# Patient Record
Sex: FEMALE | Race: WHITE | Employment: OTHER | URBAN - METROPOLITAN AREA
[De-identification: names, ages, dates, MRNs, and addresses within clinical notes are randomized per-mention and may not be internally consistent; named-entity substitution may affect disease eponyms.]

---

## 2017-01-05 ENCOUNTER — OFFICE VISIT - RIVER FALLS (OUTPATIENT)
Dept: FAMILY MEDICINE | Facility: CLINIC | Age: 47
End: 2017-01-05
Payer: COMMERCIAL

## 2017-02-03 ENCOUNTER — OFFICE VISIT - RIVER FALLS (OUTPATIENT)
Dept: FAMILY MEDICINE | Facility: CLINIC | Age: 47
End: 2017-02-03
Payer: COMMERCIAL

## 2017-02-21 ENCOUNTER — OFFICE VISIT - RIVER FALLS (OUTPATIENT)
Dept: FAMILY MEDICINE | Facility: CLINIC | Age: 47
End: 2017-02-21
Payer: COMMERCIAL

## 2017-03-27 ENCOUNTER — OFFICE VISIT - RIVER FALLS (OUTPATIENT)
Dept: FAMILY MEDICINE | Facility: CLINIC | Age: 47
End: 2017-03-27
Payer: COMMERCIAL

## 2017-04-28 ENCOUNTER — OFFICE VISIT - RIVER FALLS (OUTPATIENT)
Dept: FAMILY MEDICINE | Facility: CLINIC | Age: 47
End: 2017-04-28
Payer: COMMERCIAL

## 2017-05-12 ENCOUNTER — COMMUNICATION - RIVER FALLS (OUTPATIENT)
Dept: FAMILY MEDICINE | Facility: CLINIC | Age: 47
End: 2017-05-12
Payer: COMMERCIAL

## 2017-05-12 ENCOUNTER — OFFICE VISIT - RIVER FALLS (OUTPATIENT)
Dept: FAMILY MEDICINE | Facility: CLINIC | Age: 47
End: 2017-05-12
Payer: COMMERCIAL

## 2017-06-06 ENCOUNTER — COMMUNICATION - RIVER FALLS (OUTPATIENT)
Dept: FAMILY MEDICINE | Facility: CLINIC | Age: 47
End: 2017-06-06
Payer: COMMERCIAL

## 2017-06-06 ENCOUNTER — AMBULATORY - RIVER FALLS (OUTPATIENT)
Dept: FAMILY MEDICINE | Facility: CLINIC | Age: 47
End: 2017-06-06
Payer: COMMERCIAL

## 2017-07-17 ENCOUNTER — AMBULATORY - RIVER FALLS (OUTPATIENT)
Dept: FAMILY MEDICINE | Facility: CLINIC | Age: 47
End: 2017-07-17
Payer: COMMERCIAL

## 2017-08-18 ENCOUNTER — AMBULATORY - RIVER FALLS (OUTPATIENT)
Dept: FAMILY MEDICINE | Facility: CLINIC | Age: 47
End: 2017-08-18
Payer: COMMERCIAL

## 2017-09-25 ENCOUNTER — AMBULATORY - RIVER FALLS (OUTPATIENT)
Dept: FAMILY MEDICINE | Facility: CLINIC | Age: 47
End: 2017-09-25
Payer: COMMERCIAL

## 2017-10-04 ENCOUNTER — TRANSFERRED RECORDS (OUTPATIENT)
Dept: HEALTH INFORMATION MANAGEMENT | Facility: CLINIC | Age: 47
End: 2017-10-04

## 2017-10-04 LAB — PAP SMEAR - HIM PATIENT REPORTED: NEGATIVE

## 2017-11-01 ENCOUNTER — AMBULATORY - RIVER FALLS (OUTPATIENT)
Dept: FAMILY MEDICINE | Facility: CLINIC | Age: 47
End: 2017-11-01
Payer: COMMERCIAL

## 2017-12-14 ENCOUNTER — AMBULATORY - RIVER FALLS (OUTPATIENT)
Dept: FAMILY MEDICINE | Facility: CLINIC | Age: 47
End: 2017-12-14
Payer: COMMERCIAL

## 2017-12-14 ENCOUNTER — OFFICE VISIT - RIVER FALLS (OUTPATIENT)
Dept: FAMILY MEDICINE | Facility: CLINIC | Age: 47
End: 2017-12-14
Payer: COMMERCIAL

## 2017-12-14 ENCOUNTER — TRANSFERRED RECORDS (OUTPATIENT)
Dept: HEALTH INFORMATION MANAGEMENT | Facility: CLINIC | Age: 47
End: 2017-12-14

## 2017-12-14 ASSESSMENT — MIFFLIN-ST. JEOR: SCORE: 1276.1

## 2018-01-25 ENCOUNTER — AMBULATORY - RIVER FALLS (OUTPATIENT)
Dept: FAMILY MEDICINE | Facility: CLINIC | Age: 48
End: 2018-01-25
Payer: COMMERCIAL

## 2018-01-26 ENCOUNTER — AMBULATORY - RIVER FALLS (OUTPATIENT)
Dept: FAMILY MEDICINE | Facility: CLINIC | Age: 48
End: 2018-01-26
Payer: COMMERCIAL

## 2018-02-09 ENCOUNTER — AMBULATORY - RIVER FALLS (OUTPATIENT)
Dept: FAMILY MEDICINE | Facility: CLINIC | Age: 48
End: 2018-02-09
Payer: COMMERCIAL

## 2018-03-12 ENCOUNTER — AMBULATORY - RIVER FALLS (OUTPATIENT)
Dept: FAMILY MEDICINE | Facility: CLINIC | Age: 48
End: 2018-03-12
Payer: COMMERCIAL

## 2018-04-12 ENCOUNTER — AMBULATORY - RIVER FALLS (OUTPATIENT)
Dept: FAMILY MEDICINE | Facility: CLINIC | Age: 48
End: 2018-04-12
Payer: COMMERCIAL

## 2018-05-14 ENCOUNTER — AMBULATORY - RIVER FALLS (OUTPATIENT)
Dept: FAMILY MEDICINE | Facility: CLINIC | Age: 48
End: 2018-05-14
Payer: COMMERCIAL

## 2018-06-08 ENCOUNTER — AMBULATORY - RIVER FALLS (OUTPATIENT)
Dept: FAMILY MEDICINE | Facility: CLINIC | Age: 48
End: 2018-06-08
Payer: COMMERCIAL

## 2018-07-26 ENCOUNTER — OFFICE VISIT - RIVER FALLS (OUTPATIENT)
Dept: FAMILY MEDICINE | Facility: CLINIC | Age: 48
End: 2018-07-26
Payer: COMMERCIAL

## 2018-07-26 ENCOUNTER — AMBULATORY - RIVER FALLS (OUTPATIENT)
Dept: FAMILY MEDICINE | Facility: CLINIC | Age: 48
End: 2018-07-26
Payer: COMMERCIAL

## 2018-08-28 ENCOUNTER — AMBULATORY - RIVER FALLS (OUTPATIENT)
Dept: FAMILY MEDICINE | Facility: CLINIC | Age: 48
End: 2018-08-28
Payer: COMMERCIAL

## 2018-09-13 ENCOUNTER — AMBULATORY - RIVER FALLS (OUTPATIENT)
Dept: FAMILY MEDICINE | Facility: CLINIC | Age: 48
End: 2018-09-13
Payer: COMMERCIAL

## 2018-10-11 ENCOUNTER — AMBULATORY - RIVER FALLS (OUTPATIENT)
Dept: FAMILY MEDICINE | Facility: CLINIC | Age: 48
End: 2018-10-11
Payer: COMMERCIAL

## 2018-10-11 ENCOUNTER — TRANSFERRED RECORDS (OUTPATIENT)
Dept: HEALTH INFORMATION MANAGEMENT | Facility: CLINIC | Age: 48
End: 2018-10-11

## 2018-11-01 ENCOUNTER — TELEPHONE (OUTPATIENT)
Dept: OTHER | Facility: CLINIC | Age: 48
End: 2018-11-01

## 2018-11-01 ENCOUNTER — OFFICE VISIT (OUTPATIENT)
Dept: FAMILY MEDICINE | Facility: CLINIC | Age: 48
End: 2018-11-01
Payer: COMMERCIAL

## 2018-11-01 VITALS
RESPIRATION RATE: 18 BRPM | BODY MASS INDEX: 23.22 KG/M2 | DIASTOLIC BLOOD PRESSURE: 62 MMHG | WEIGHT: 136 LBS | HEART RATE: 66 BPM | TEMPERATURE: 98 F | SYSTOLIC BLOOD PRESSURE: 100 MMHG | OXYGEN SATURATION: 98 % | HEIGHT: 64 IN

## 2018-11-01 DIAGNOSIS — D68.62 LUPUS ANTICOAGULANT DISORDER (H): ICD-10-CM

## 2018-11-01 DIAGNOSIS — J45.30 MILD PERSISTENT ASTHMA, UNSPECIFIED WHETHER COMPLICATED: ICD-10-CM

## 2018-11-01 DIAGNOSIS — I83.812 VARICOSE VEINS OF LEFT LOWER EXTREMITY WITH PAIN: ICD-10-CM

## 2018-11-01 DIAGNOSIS — D68.51 FACTOR V LEIDEN (H): ICD-10-CM

## 2018-11-01 DIAGNOSIS — Z79.01 LONG TERM CURRENT USE OF ANTICOAGULANT THERAPY: ICD-10-CM

## 2018-11-01 DIAGNOSIS — I82.409 RECURRENT DEEP VEIN THROMBOSIS (DVT) (H): Primary | ICD-10-CM

## 2018-11-01 LAB — INR PPP: 3.08 (ref 0.86–1.14)

## 2018-11-01 PROCEDURE — 85610 PROTHROMBIN TIME: CPT | Performed by: FAMILY MEDICINE

## 2018-11-01 PROCEDURE — 36415 COLL VENOUS BLD VENIPUNCTURE: CPT | Performed by: FAMILY MEDICINE

## 2018-11-01 PROCEDURE — 99203 OFFICE O/P NEW LOW 30 MIN: CPT | Performed by: FAMILY MEDICINE

## 2018-11-01 RX ORDER — WARFARIN SODIUM 5 MG/1
TABLET ORAL
Qty: 1 TABLET | Refills: 0 | COMMUNITY
Start: 2018-11-01 | End: 2019-01-14

## 2018-11-01 ASSESSMENT — PAIN SCALES - GENERAL: PAINLEVEL: NO PAIN (0)

## 2018-11-01 NOTE — PATIENT INSTRUCTIONS
Thank you for choosing [facility].  You may be receiving a survey in the mail from [facility] regarding your visit today.  Please take a few minutes to complete and return the survey to let us know how we are doing.      Our Clinic hours are:  Mondays    7:20 am - 7 pm  Tues - Fri  7:20 am - 5 pm    Clinic Phone: [phone]    The clinic lab opens at 7:30 am Mon - Fri and appointments are required.    [facility]. [phone]  Monday  8 am - 7pm  Tues - Fri 8 am - 5:30 pm

## 2018-11-01 NOTE — MR AVS SNAPSHOT
After Visit Summary   11/1/2018    Ignacio Cooper    MRN: [id number]           Patient Information     Date Of Birth          1970        Visit Information        Provider Department      11/1/2018 9:40 AM Fidelia Forbes MD [facility]        Today's Diagnoses     Recurrent deep vein thrombosis (DVT) (H)    -  1    Lupus anticoagulant disorder (H)        Factor V Leiden (H)        Long term current use of anticoagulant therapy        Mild persistent asthma, unspecified whether complicated        Varicose veins of left lower extremity with pain          Care Instructions          Thank you for choosing [facility].  You may be receiving a survey in the mail from [employer] regarding your visit today.  Please take a few minutes to complete and return the survey to let us know how we are doing.      Our Clinic hours are:  Mondays    7:20 am - 7 pm  Tues -  Fri  7:20 am - 5 pm    Clinic Phone: [phone]    The clinic lab opens at 7:30 am Mon - Fri and appointments are required.    [facility] [place]  Ph. [phone]  Monday  8 am - 7pm  Tues - Fri 8 am - 5:30 pm                 Follow-ups after your visit        Additional Services     INR CLINIC REFERRAL       Your provider has referred you to INR Services.    Please be aware that coverage of these services is subject to the terms and limitations of your health insurance plan.  Call member services at your health plan with any benefit or coverage questions.    Indication for Anticoagulation: DVT (recurrent)  Other: lupus anticoagulant and factor V leiden positive  If nonstandard INR is desired, indicate goal range and explanation:  n/a  Expected Duration of Therapy: Lifetime            VASCULAR SURGERY REFERRAL       Your provider has referred you to: **Vascular  Services [phone] - Varicose Veins with history of multiple DVTs and venous surgeries (stripping, ablation, and has some vascular  "stenting in the iliac vein & None - Please Order Appropriate Testing   [email or web address]    Please be aware that coverage of these services is subject to the terms and limitations of your health insurance plan.  Call member services at your health plan with any benefit or coverage questions.      Please bring the following with you to your appointment:    (1) Any X-Rays, CTs or MRIs which have been performed.  Contact the facility where they were done to arrange for  prior to your scheduled appointment.    (2) List of current medications   (3) This referral request   (4) Any documents/labs given to you for this referral                  Who to contact     If you have questions or need follow up information about today's clinic visit or your schedule please contact [facility] directly at [phone].  Normal or non-critical lab and imaging results will be communicated to you by MyChart, letter or phone within 4 business days after the clinic has received the results. If you do not hear from us within 7 days, please contact the clinic through MyChart or phone. If you have a critical or abnormal lab result, we will notify you by phone as soon as possible.  Submit refill requests through [employer] or call your pharmacy and they will forward the refill request to us. Please allow 3 business days for your refill to be completed.          Additional Information About Your Visit        Care EveryWhere ID     This is your Care EveryWhere ID. This could be used by other organizations to access your [place] medical records  [id number]        Your Vitals Were     Pulse Temperature Respirations Height Last Period Pulse Oximetry    66 98  F (36.7  C) (Tympanic) 18 5' 4\" (1.626 m) 10/20/2018 98%    Breastfeeding? BMI (Body Mass Index)                No 23.34 kg/m2           Blood Pressure from Last 3 Encounters:   11/01/18 100/62    Weight from Last 3 Encounters: "   11/01/18 136 lb (61.7 kg)              We Performed the Following     INR CLINIC REFERRAL     VASCULAR SURGERY REFERRAL          Where to get your medicines      [place] [facility] [place] [address] [facility]  [address] [facility] [address]     Phone:  [phone]     fluticasone-vilanterol 100-25 MCG/INH inhaler          Primary Care Provider Office Phone # Fax #    [facility] [phone] [address] [facility] [address]        Equal Access to Services     [facility] : Hadii aad ku hadasho Soomaali, waaxda luqadaha, qaybta kaalmada adeegyada, waxay idiin hayaan adesandhya brown . So [facility] [phone].    ATENCIÓN: Si habla español, tiene a rubi disposición servicios gratuitos de asistencia lingüística. [facility] [phone].    We comply with applicable federal civil rights laws and [place] laws. We do not discriminate on the basis of race, color, national origin, age, disability, sex, sexual orientation, or gender identity.            Thank you!     Thank you for choosing [facility]  for your care. Our goal is always to provide you with excellent care. Hearing back from our patients is one way we can continue to improve our services. Please take a few minutes to complete the written survey that you may receive in the mail after your visit with us. Thank you!             Your Updated Medication List - Protect others around you: Learn how to safely use, store and throw away your medicines at www.disposemymeds.org.          This list is accurate as of 11/1/18 10:19 AM.  Always use your most recent med list.                   Brand Name Dispense Instructions for use Diagnosis    fluticasone-vilanterol 100-25 MCG/INH inhaler    BREO ELLIPTA    60 Inhaler    Inhale 1 puff into the lungs daily    Mild persistent asthma, unspecified whether complicated       warfarin 5  MG tablet    COUMADIN    1 tablet    Take 1 tablet (5 mg) by mouth daily 5 mg for two days, then 7.5 mg x 1 day, repeat

## 2018-11-01 NOTE — PROGRESS NOTES
"  SUBJECTIVE:   Ignacio Cooper is a 48 year old female who presents to clinic today for the following health issues:      Chief Complaint   Patient presents with     INR Followup     Needs someone to follow care     SUBJECTIVE:  Ignacio Cooper, a 48 year old female scheduled an appointment to discuss the following issues:     Recurrent deep vein thrombosis (DVT) (H)  Lupus anticoagulant disorder (H)  Factor V Leiden (H)  Long term current use of anticoagulant therapy  Mild persistent asthma, unspecified whether complicated  Varicose veins of left lower extremity with pain     Transfer of care from [facility].  Her  has worked at [employer] and was commuting over an hour a day, they finally decided to move.  Has five kids ages 12-23.      Patient has a history of multiple recurrent DVTs, +Factor V, +lupus anticoagulant.  She is on lifelong anticoagulation.  She would also like to see vascular to discuss left lower extremity varicose veins.      She has mild persistent asthma, was recently seen at [facility] and started on Breo Ellipta. Seems to be helping her symptoms.    Social History   Substance Use Topics     Smoking status: Never Smoker     Smokeless tobacco: Never Used     Alcohol use No         Medical, social, surgical, and family histories reviewed.    ROS:  5 point ROS negative except as noted above in HPI, including Gen., Resp., CV, GI &  system review.    Family History   Problem Relation Age of Onset     Lupus Mother      Breast Cancer Mother      Blood Disease Mother      Factor 5     HEART DISEASE Mother      Diabetes Father      Asthma Father      Past Surgical History:   Procedure Laterality Date     C SC LIGATION, DIVISION AND STRIPPING OF VEIN, LOWER EXN       iliac vein stent       OPEN REDUCTION INTERNAL FIXATION WRIST           OBJECTIVE:  /62  Pulse 66  Temp 98  F (36.7  C) (Tympanic)  Resp 18  Ht 5' 4\" (1.626 m)  Wt 136 lb (61.7 kg)  LMP 10/20/2018  SpO2 98%  " "Breastfeeding? No  BMI 23.34 kg/m2  EXAM:  GENERAL APPEARANCE: Alert, no acute distress  EYES: PERRL, EOM normal, conjunctiva and lids normal  RESP: lungs clear to auscultation   CV: normal rate, regular rhythm, no murmur or gallop  ABDOMEN: soft, no organomegaly, masses or tenderness  MS: extremities normal, no peripheral edema  SKIN: no suspicious lesions or rashes  PSYCH: mentation appears normal., affect and mood normal    ASSESSMENT/PLAN:    (I82.409) Recurrent deep vein thrombosis (DVT) (H)  (primary encounter diagnosis)  Comment:    Plan: INR CLINIC REFERRAL, VASCULAR SURGERY REFERRAL,        INR             (D68.62) Lupus anticoagulant disorder (H)  Comment:    Plan: INR CLINIC REFERRAL, VASCULAR SURGERY REFERRAL,        INR             (D68.51) Factor V Leiden (H)  Comment:    Plan: INR CLINIC REFERRAL, VASCULAR SURGERY REFERRAL,        INR             (Z79.01) Long term current use of anticoagulant therapy  Comment:    Plan: INR CLINIC REFERRAL         Check INR today, needs lifetime anticoagulation    (J45.30) Mild persistent asthma, unspecified whether complicated  Comment:    Plan: fluticasone-vilanterol (BREO ELLIPTA) 100-25         MCG/INH inhaler         Patient declined albuterol inhaler    (I83.812) Varicose veins of left lower extremity with pain  Comment: interested in seeing vascular to discuss treatment options for left leg varicose veins that cause pain.  She was seen at [facility] and \"they treated me like I was only worried about the cosmetics and didn't seem to understand my condition\"  Plan: VASCULAR SURGERY REFERRAL                  Patient Instructions         Thank you for choosing [facility]  You may be receiving a survey in the mail from [facility] regarding your visit today.  Please take a few minutes to complete and return the survey to let us know how we are doing.      Our Clinic hours are:  Mondays    7:20 am - 7 pm  Tues -  Fri  7:20 am - 5 pm    Clinic Phone: " [phone]    The clinic lab opens at 7:30 am Mon - Fri and appointments are required.    [facility]. [phone]  Monday  8 am - 7pm  Tues - Fri 8 am - 5:30 pm

## 2018-11-01 NOTE — TELEPHONE ENCOUNTER
Pt referred to [facility] by Fidelia Forbes MD  for DVT, Lupus, Factor V Leiden, varicose veins.    I called and spoke with patient in regards to new referral- she reports that she was receiving care in [place] thru [facility]. Note made in appointment for [place] clinic to obtain care everywhere consent and pull records from [facility] on patients appointment day. Patient denies recent imaging.     Pt scheduled for consult with Dr. Mercedes in [place] on 11/8/18.      Sena Au, FREYAN, RN

## 2018-11-02 DIAGNOSIS — D68.62 LUPUS ANTICOAGULANT DISORDER (H): ICD-10-CM

## 2018-11-02 DIAGNOSIS — D68.51 FACTOR V LEIDEN (H): ICD-10-CM

## 2018-11-02 DIAGNOSIS — I82.409 RECURRENT DEEP VEIN THROMBOSIS (DVT) (H): Primary | ICD-10-CM

## 2018-11-02 NOTE — PROGRESS NOTES
Notify patient - INR is 3.08, may want to skip one of the 7.5 mg pills and do 5 mg daily for that day.  Follow up in a few weeks as planned with INR clinic.    Fidelia Forbes M.D.

## 2018-11-05 ENCOUNTER — ANTICOAGULATION THERAPY VISIT (OUTPATIENT)
Dept: ANTICOAGULATION | Facility: CLINIC | Age: 48
End: 2018-11-05
Payer: COMMERCIAL

## 2018-11-05 DIAGNOSIS — D68.62 LUPUS ANTICOAGULANT DISORDER (H): ICD-10-CM

## 2018-11-05 DIAGNOSIS — D68.51 FACTOR V LEIDEN (H): ICD-10-CM

## 2018-11-05 DIAGNOSIS — I82.409 RECURRENT DEEP VEIN THROMBOSIS (DVT) (H): ICD-10-CM

## 2018-11-05 DIAGNOSIS — Z79.01 LONG TERM CURRENT USE OF ANTICOAGULANT THERAPY: Primary | ICD-10-CM

## 2018-11-05 PROBLEM — J45.30 MILD PERSISTENT ASTHMA, UNSPECIFIED WHETHER COMPLICATED: Status: ACTIVE | Noted: 2018-11-05

## 2018-11-05 PROCEDURE — 99207 ZZC NO CHARGE NURSE ONLY: CPT

## 2018-11-05 NOTE — PROGRESS NOTES
ANTICOAGULATION FOLLOW-UP CLINIC VISIT    Patient Name:  Ignacio Cooper  Date:  11/5/2018  Contact Type:  Telephone (dosing instructions came from Dr. Fidelia Forbes)    SUBJECTIVE:     Patient Findings     Positives No Problem Findings    Comments Patient has been on warfarin for 19 years, she does not need a new consult appointment with [facility]. Writer confirmed what days patient was taking which warfarin dose. She takes 5mg, 5mg, 7.5mg and then repeats.     She states she has been stable on this warfarin dose. In the past she had her family practice provider managing her warfarin. She also would prefer to go to the lab (does not prefer to have the POCT). She is aware [facility] will call either Tuesday or Wednesday with the result and further warfarin dosing instructions.    Writer explained our clinic typically does not dose warfarin with the rotating schedule because some weeks she will have more warfarin than others due to the odd number of days in the week. She is open to switching her dosing to a set weekly dose if needed.            OBJECTIVE    INR   Date Value Ref Range Status   11/01/2018 3.08 (H) 0.86 - 1.14 Final       ASSESSMENT / PLAN  INR assessment THER +/- 0.1 of goal INR range   Recheck INR In: 2 WEEKS    INR Location Outside lab      Anticoagulation Summary as of 11/5/2018     INR goal 2.0-3.0   Today's INR 3.08! (11/1/2018)   Warfarin maintenance plan 5 mg (5 mg x 1), then 5 mg (5 mg x 1), then 7.5 mg (5 mg x 1.5) repeating every 3 days   Full warfarin instructions 5 mg, then 5 mg, then 7.5 mg repeating every 3 days   Average weekly warfarin total 40.84615378205386406 mg   Plan last modified Koki Ascencio RN (11/5/2018)   Next INR check 11/13/2018   Priority INR   Target end date Indefinite    Indications   Lupus anticoagulant disorder (H) [D68.62]  Factor V Leiden (H) [D68.51]  Recurrent deep vein thrombosis (DVT) (H) [I82.409]         Anticoagulation Episode Summary     INR check location      Preferred lab     Send INR reminders to CL ANTICOAG POOL    Comments       Anticoagulation Care Providers     Provider Role Specialty Phone number    Fidelia Forbes MD Referring [facility] [phone]            See the Encounter Report to view Anticoagulation Flowsheet and Dosing Calendar (Go to Encounters tab in chart review, and find the Anticoagulation Therapy Visit)        Koki Ascencio RN [facility]

## 2018-11-05 NOTE — MR AVS SNAPSHOT
Ignacio Malhotrajair   11/5/2018   Anticoagulation Therapy Visit    Description:  48 year old female   Provider:  Koki Ascencio, RN   Department:  [facility]           INR as of 11/5/2018     Today's INR 3.08! (11/1/2018)      Anticoagulation Summary as of 11/5/2018     INR goal 2.0-3.0   Today's INR 3.08! (11/1/2018)   Full warfarin instructions 5 mg, then 5 mg, then 7.5 mg repeating every 3 days   Next INR check 11/13/2018    Indications   Lupus anticoagulant disorder (H) [D68.62]  Factor V Leiden (H) [D68.51]  Recurrent deep vein thrombosis (DVT) (H) [I82.409]         November 2018 Details    Sun Mon Tue Wed Thu Fri Sat         1               2               3                 4               5      7.5 mg   See details      6      5 mg         7      5 mg         8      7.5 mg         9      5 mg         10      5 mg           11      7.5 mg         12      5 mg         13            14               15               16               17                 18               19               20               21               22               23               24                 25               26               27               28               29               30                 Date Details   11/05 This INR check       Date of next INR:  11/13/2018         How to take your warfarin dose     To take:  5 mg Take 1 of the 5 mg tablets.    To take:  7.5 mg Take 1.5 of the 5 mg tablets.

## 2018-11-08 ENCOUNTER — OFFICE VISIT (OUTPATIENT)
Dept: VASCULAR SURGERY | Facility: CLINIC | Age: 48
End: 2018-11-08
Payer: COMMERCIAL

## 2018-11-08 VITALS
DIASTOLIC BLOOD PRESSURE: 65 MMHG | SYSTOLIC BLOOD PRESSURE: 107 MMHG | TEMPERATURE: 99.1 F | WEIGHT: 136 LBS | BODY MASS INDEX: 23.22 KG/M2 | HEART RATE: 70 BPM | HEIGHT: 64 IN

## 2018-11-08 DIAGNOSIS — I83.12 VARICOSE VEINS OF LEFT LOWER EXTREMITY WITH INFLAMMATION: ICD-10-CM

## 2018-11-08 DIAGNOSIS — I82.409 RECURRENT DEEP VEIN THROMBOSIS (DVT) (H): ICD-10-CM

## 2018-11-08 DIAGNOSIS — D68.62 LUPUS ANTICOAGULANT DISORDER (H): ICD-10-CM

## 2018-11-08 DIAGNOSIS — D68.51 FACTOR V LEIDEN (H): Primary | ICD-10-CM

## 2018-11-08 PROCEDURE — 85730 THROMBOPLASTIN TIME PARTIAL: CPT | Performed by: INTERNAL MEDICINE

## 2018-11-08 PROCEDURE — 99204 OFFICE O/P NEW MOD 45 MIN: CPT | Performed by: INTERNAL MEDICINE

## 2018-11-08 PROCEDURE — 00000167 ZZHCL STATISTIC INR NC: Performed by: INTERNAL MEDICINE

## 2018-11-08 PROCEDURE — 85732 THROMBOPLASTIN TIME PARTIAL: CPT | Performed by: INTERNAL MEDICINE

## 2018-11-08 PROCEDURE — 36415 COLL VENOUS BLD VENIPUNCTURE: CPT | Performed by: INTERNAL MEDICINE

## 2018-11-08 PROCEDURE — 00000401 ZZHCL STATISTIC THROMBIN TIME NC: Performed by: INTERNAL MEDICINE

## 2018-11-08 PROCEDURE — 85613 RUSSELL VIPER VENOM DILUTED: CPT | Performed by: INTERNAL MEDICINE

## 2018-11-08 PROCEDURE — 85597 PHOSPHOLIPID PLTLT NEUTRALIZ: CPT | Performed by: INTERNAL MEDICINE

## 2018-11-08 NOTE — PROGRESS NOTES
Vascular Medicine Consultation     Chief Complaint   Symptomatic varicose vein left lower extremity    Date of Admission:  (Not on file)    Ignacio Cooper is a 48 year old female who symptomatic varicose veins left lower extremity.    Code Status    Full code    Reason for Consult   Reason for consult: I was asked by PCP to evaluate this patient for symptomatic varicose veins of the left lower extremity.    Primary Care Physician   [facility]      History is obtained from the patient    History of Present Illness   Ignacio Cooper is a 48 year old female who presents with symptomatic varicose veins left lower extremity, patient is having pain in a clump of varicose veins of the left lower extremity just above the ankle, patient denies any history of bleeding or spontaneous ulceration, patient has a long-standing history of recurrent DVT in the left leg x4 first episode happened when she was at the age of 24 and she was on oral contraceptive pills then she was given the diagnosis of factor V Leiden mutation homozygous type (based on the patient's own words when asked about her throat versus homozygous she said that she has the bad type), patient was also diagnosed with lupus anticoagulant based on single lab test and on obtaining her history she mentioned that she had a stent placed in the iliac vein left side with remarkable improvement of her symptoms after the stent was placed  Patient has been on oral anticoagulation, Coumadin for a number of years now, she is stable, her INR is within the target 2-3, with no complications  Patient is here today for prominent, painful, tender to touch with no evidence of thrombosis of a lump and cluster of veins on the left leg above the ankle  No signs of venous stasis, lipodermatosclerosis, when asked about itching and restless leg symptoms she denied  Patient is a runner she is fit in a very good shape and the symptoms does not increase with  exercise  Patient does not wear compression stockings  From obtaining the patient's history 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   History reviewed. No pertinent past medical history.    Past Surgical History   I have reviewed this patient's surgical history and updated it with pertinent information if needed.  Past Surgical History:   Procedure Laterality Date     C SC LIGATION, DIVISION AND STRIPPING OF VEIN, LOWER EXN       iliac vein stent       OPEN REDUCTION INTERNAL FIXATION WRIST         Prior to Admission Medications   Cannot display prior to admission medications because the patient has not been admitted in this contact.     Allergies   Allergies   Allergen Reactions     Codeine        Social History   I have reviewed this patient's social history and updated it with pertinent information if needed. Ignacio Cooper  reports that she has never smoked. She has never used smokeless tobacco. She reports that she does not drink alcohol or use illicit drugs.    Family History   I have reviewed this patient's family history and updated it with pertinent information if needed.   Family History   Problem Relation Age of Onset     Lupus Mother      Breast Cancer Mother      Blood Disease Mother      Factor 5     HEART DISEASE Mother      Diabetes Father      Asthma Father        Review of Systems   The 10 point Review of Systems is negative other than noted in the HPI or here.     Physical Exam   Temp: 99.1  F (37.3  C) Temp src: Tympanic BP: 107/65 Pulse: 70            Vital Signs with Ranges  Temp:  [99.1  F (37.3  C)] 99.1  F (37.3  C)  Pulse:  [70-73] 70  BP: (106-107)/(60-65) 107/65  136 lbs 0 oz    Constitutional: awake, alert,  cooperative, no apparent distress, and appears stated age  Eyes: Lids and lashes normal, pupils equal, round and reactive to light, extra ocular muscles intact, sclera clear, conjunctiva normal  ENT: normocepalic, without obvious abnormality, oropharynx pink and moist  Hematologic / Lymphatic: no lymphadenopathy  Respiratory: No increased work of breathing, good air exchange, clear to auscultation bilaterally, no crackles or wheezing  Cardiovascular: regular rate and rhythm, normal S1 and S2 and no murmur noted  GI: Normal bowel sounds, soft, non-distended, non-tender  Skin: no redness, warmth, or swelling, no rashes and no lesions  Musculoskeletal: There is no redness, warmth, or swelling of the joints.  Full range of motion noted.  Motor strength is 5 out of 5 all extremities bilaterally.  Tone is normal.  Neurologic: Awake, alert, oriented to name, place and time.  Cranial nerves II-XII are grossly intact.  Motor is 5 out of 5 bilaterally.    Neuropsychiatric:  Normal affect, memory, insight.  Pulses: Palpable pulses intact  . No carotid bruits appreciated.     Data   Most Recent 3 CBC's:No lab results found.  Most Recent 3 BMP's:No lab results found.  Most Recent 2 LFT's:No lab results found.  Most Recent TSH and T4:No lab results found.  Most Recent Hemoglobin A1c:No lab results found.  Most Recent 6 glucoses:No lab results found.    Invalid input(s): BPM  Most Recent Urinalysis:No lab results found.  Most Recent ESR & CRP:No lab results found.      Assessment & Plan   (D68.51) Factor V Leiden (H)  (primary encounter diagnosis)  Comment: Stable patient is on anticoagulation  Plan: Continue with anticoagulation, patient will need to test her kids for factor V mutation    (D68.62) Lupus anticoagulant disorder (H)  Comment: Positive only once  Plan: Lupus Anticoagulant Panel        Today and in 3 months from now    (I82.409) Recurrent deep vein thrombosis (DVT) (H)  Comment: Most probably secondary to May Thurner  anatomy/syndrome, after 5 mutation homozygous type and the fact that she was on oral contraceptive  Plan: US Venous Competency Left        To assess the severity of reflux    (I83.12) Varicose veins of left lower extremity with inflammation  Comment: Patient will probably benefit from sclerotherapy localized to the cluster of veins/varicose veins  Plan: US Venous Competency Left              Summary: We will see the patient once test results are available and will arrange for sclerotherapy    Jose F Mercedes MD

## 2018-11-08 NOTE — NURSING NOTE
"Initial /65 (BP Location: Left arm, Patient Position: Sitting, Cuff Size: Adult Regular)  Pulse 70  Temp 99.1  F (37.3  C) (Tympanic)  Ht 1.626 m (5' 4\")  Wt 61.7 kg (136 lb)  LMP 10/20/2018  BMI 23.34 kg/m2 Estimated body mass index is 23.34 kg/(m^2) as calculated from the following:    Height as of this encounter: 1.626 m (5' 4\").    Weight as of this encounter: 61.7 kg (136 lb). .    Fidelia Alves MA    "

## 2018-11-08 NOTE — MR AVS SNAPSHOT
After Visit Summary   11/8/2018    Ignacio Cooper    MRN: [id number]           Patient Information     Date Of Birth          1970        Visit Information        Provider Department      11/8/2018 1:00 PM Jose F Mercedes MD [facility]        Today's Diagnoses     Factor V Leiden (H)    -  1    Lupus anticoagulant disorder (H)        Recurrent deep vein thrombosis (DVT) (H)        Varicose veins of left lower extremity with inflammation           Follow-ups after your visit        Follow-up notes from your care team     Return in about 4 weeks (around 12/6/2018).      Your next 10 appointments already scheduled     Nov 13, 2018 10:00 AM CST   LAB with CL LAB   [facility] ([facility])    [address] [facility] [id number] [phone]           Please do not eat 10-12 hours before your appointment if you are coming in fasting for labs on lipids, cholesterol, or glucose (sugar). This does not apply to pregnant women. Water, hot tea and black coffee (with nothing added) are okay. Do not drink other fluids, diet soda or chew gum.              Future tests that were ordered for you today     Open Future Orders        Priority Expected Expires Ordered    US Venous Competency Left Routine 11/8/2018 11/8/2019 11/8/2018            Who to contact     If you have questions or need follow up information about today's clinic visit or your schedule please contact [facility] directly at [phone].  Normal or non-critical lab and imaging results will be communicated to you by MyChart, letter or phone within 4 business days after the clinic has received the results. If you do not hear from us within 7 days, please contact the clinic through MyChart or phone. If you have a critical or abnormal lab result, we will notify you by phone as soon as possible.  Submit refill requests through [employer] or call your pharmacy and they  "will forward the refill request to us. Please allow 3 business days for your refill to be completed.          Additional Information About Your Visit        Care EveryWhere ID     This is your Care EveryWhere ID. This could be used by other organizations to access your [place] medical records  [id number]        Your Vitals Were     Pulse Temperature Height Last Period BMI (Body Mass Index)       70 99.1  F (37.3  C) (Tympanic) 1.626 m (5' 4\") 10/20/2018 23.34 kg/m2        Blood Pressure from Last 3 Encounters:   11/08/18 107/65   11/01/18 100/62    Weight from Last 3 Encounters:   11/08/18 61.7 kg (136 lb)   11/01/18 61.7 kg (136 lb)              We Performed the Following     Lupus Anticoagulant Panel        Primary Care Provider Office Phone # Fax #    [facility] [phone] [address] [facility] [address]        Equal Access to Services     LU CISSE : Hadii aad ku hadasho Soomaali, waaxda luqadaha, qaybta kaalmada adeegyada, waxay alidain haysheelan lionel brown . So [facility] [phone].    ATENCIÓN: Si harikala español, tiene a rubi disposición servicios gratuitos de asistencia lingüística. Llame al [phone].    We comply with applicable federal civil rights laws and [place] laws. We do not discriminate on the basis of race, color, national origin, age, disability, sex, sexual orientation, or gender identity.            Thank you!     Thank you for choosing [facility]  for your care. Our goal is always to provide you with excellent care. Hearing back from our patients is one way we can continue to improve our services. Please take a few minutes to complete the written survey that you may receive in the mail after your visit with us. Thank you!             Your Updated Medication List - Protect others around you: Learn how to safely use, store and throw away your medicines at www.disposemymeds.org.          This list is accurate as of 11/8/18  " 1:45 PM.  Always use your most recent med list.                   Brand Name Dispense Instructions for use Diagnosis    fluticasone-vilanterol 100-25 MCG/INH inhaler    BREO ELLIPTA    60 Inhaler    Inhale 1 puff into the lungs daily    Mild persistent asthma, unspecified whether complicated       warfarin 5 MG tablet    COUMADIN    1 tablet    Take 5 mg, then 5 mg, then 7.5 mg repeating every 3 days or as directed by the Anticoagulation Clinic

## 2018-11-12 LAB — LA PPP-IMP: NEGATIVE

## 2018-11-13 ENCOUNTER — ANTICOAGULATION THERAPY VISIT (OUTPATIENT)
Dept: ANTICOAGULATION | Facility: CLINIC | Age: 48
End: 2018-11-13

## 2018-11-13 ENCOUNTER — TELEPHONE (OUTPATIENT)
Dept: FAMILY MEDICINE | Facility: CLINIC | Age: 48
End: 2018-11-13

## 2018-11-13 DIAGNOSIS — Z79.01 LONG TERM CURRENT USE OF ANTICOAGULANT THERAPY: ICD-10-CM

## 2018-11-13 DIAGNOSIS — J45.30 MILD PERSISTENT ASTHMA, UNSPECIFIED WHETHER COMPLICATED: Primary | ICD-10-CM

## 2018-11-13 DIAGNOSIS — I82.409 RECURRENT DEEP VEIN THROMBOSIS (DVT) (H): ICD-10-CM

## 2018-11-13 DIAGNOSIS — D68.62 LUPUS ANTICOAGULANT DISORDER (H): ICD-10-CM

## 2018-11-13 DIAGNOSIS — D68.51 FACTOR V LEIDEN (H): ICD-10-CM

## 2018-11-13 LAB — INR PPP: 2.93 (ref 0.86–1.14)

## 2018-11-13 PROCEDURE — 36415 COLL VENOUS BLD VENIPUNCTURE: CPT | Performed by: FAMILY MEDICINE

## 2018-11-13 PROCEDURE — 85610 PROTHROMBIN TIME: CPT | Performed by: FAMILY MEDICINE

## 2018-11-13 PROCEDURE — 99207 ZZC NO CHARGE NURSE ONLY: CPT

## 2018-11-13 NOTE — TELEPHONE ENCOUNTER
"Dr. Forbes,    S-(situation): \"Breo Ellipta 100-25 mcg is not strong enough\"    B-(background): [facility] put her on the 200-25 mcg Breo earlier this year and it helped    A-(assessment): Patient states her old Breo Ellipta inhaler prescribed by [facility] was 200-25 mcg. She states that dose worked in taking her cough away. She states she has been taking the Breo 100-25 for about two weeks now and it is not working. She states the cough is still there and not improving.    R-(recommendations): Can we send a new Rx for Breo Ellipta 200-25 for patient?  LOV 11/01/2018    Thanks,  Malini CHASE RN      "

## 2018-11-13 NOTE — TELEPHONE ENCOUNTER
Reason for call:  Patient reporting a symptom    Symptom or request: Pt was prescribed Breo 11/1/18, by Dr. Forbes and she doesn't feel that it is strong enough dose.  Please call patient and advise.      Duration (how long have symptoms been present): Ongoing    Have you been treated for this before? Yes    Additional comments:     Phone Number patient can be reached at:  Home number on file [phone] (home)    Best Time:  any    Can we leave a detailed message on this number:  YES    Call taken on 11/13/2018 at 8:46 AM by Oma Pruett

## 2018-11-13 NOTE — MR AVS SNAPSHOT
Ignacio VILCHIS Allison   11/13/2018   Anticoagulation Therapy Visit    Description:  48 year old female   Provider:  Alexandria Ramon, RN   Department:  [facility]           INR as of 11/13/2018     Today's INR 2.93      Anticoagulation Summary as of 11/13/2018     INR goal 2.0-3.0   Today's INR 2.93   Full warfarin instructions 5 mg, then 5 mg, then 7.5 mg repeating every 3 days   Next INR check 12/11/2018    Indications   Lupus anticoagulant disorder (H) [D68.62]  Factor V Leiden (H) [D68.51]  Recurrent deep vein thrombosis (DVT) (H) [I82.409]         November 2018 Details    Sun Mon Tue Wed Thu Fri Sat         1               2               3                 4               5               6               7               8               9               10                 11               12               13      5 mg   See details      14      7.5 mg         15      5 mg         16      5 mg         17      7.5 mg           18      5 mg         19      5 mg         20      7.5 mg         21      5 mg         22      5 mg         23      7.5 mg         24      5 mg           25      5 mg         26      7.5 mg         27      5 mg         28      5 mg         29      7.5 mg         30      5 mg           Date Details   11/13 This INR check               How to take your warfarin dose     To take:  5 mg Take 1 of the 5 mg tablets.    To take:  7.5 mg Take 1.5 of the 5 mg tablets.           December 2018 Details    Sun Mon Tue Wed Thu Fri Sat           1      5 mg           2      7.5 mg         3      5 mg         4      5 mg         5      7.5 mg         6      5 mg         7      5 mg         8      7.5 mg           9      5 mg         10      5 mg         11            12               13               14               15                 16               17               18               19               20               21               22                 23               24               25               26                27               28               29                 30               31                     Date Details   No additional details    Date of next INR:  12/11/2018         How to take your warfarin dose     To take:  5 mg Take 1 of the 5 mg tablets.    To take:  7.5 mg Take 1.5 of the 5 mg tablets.

## 2018-11-13 NOTE — PROGRESS NOTES
ANTICOAGULATION FOLLOW-UP CLINIC VISIT    Patient Name:  Ignacio Cooper  Date:  11/13/2018  Contact Type:  Telephone    SUBJECTIVE:     Patient Findings     Positives No Problem Findings    Comments No changes in medications, diet, or activity. No concerns with bleeding or bruising. Took warfarin as prescribed.   Continue maintenance warfarin dose. Will recheck INR in 4 weeks.   Patient verbalizes understanding and agrees with plan. No further questions at this time.              OBJECTIVE    INR   Date Value Ref Range Status   11/13/2018 2.93 (H) 0.86 - 1.14 Final       ASSESSMENT / PLAN  INR assessment THER    Recheck INR In: 4 WEEKS    INR Location Clinic      Anticoagulation Summary as of 11/13/2018     INR goal 2.0-3.0   Today's INR 2.93   Warfarin maintenance plan 5 mg (5 mg x 1), then 5 mg (5 mg x 1), then 7.5 mg (5 mg x 1.5) repeating every 3 days   Full warfarin instructions 5 mg, then 5 mg, then 7.5 mg repeating every 3 days   Average weekly warfarin total 40.30105638460633635 mg   No change documented Alexandria Ramon RN   Plan last modified Koki Ascencio RN (11/5/2018)   Next INR check 12/11/2018   Priority INR   Target end date Indefinite    Indications   Lupus anticoagulant disorder (H) [D68.62]  Factor V Leiden (H) [D68.51]  Recurrent deep vein thrombosis (DVT) (H) [I82.409]         Anticoagulation Episode Summary     INR check location     Preferred lab     Send INR reminders to [place] [facility]    Comments *Patient switched to [facility] recently but has been taking warfarin for 19 years per patient. Recently moved to area.      Anticoagulation Care Providers     Provider Role Specialty Phone number    Fidelia Forbes MD [facility] [phone]            See the Encounter Report to view Anticoagulation Flowsheet and Dosing Calendar (Go to Encounters tab in chart review, and find the Anticoagulation Therapy Visit)        Alexandria Ramon RN

## 2018-11-26 ENCOUNTER — HOSPITAL ENCOUNTER (OUTPATIENT)
Dept: ULTRASOUND IMAGING | Facility: CLINIC | Age: 48
Discharge: HOME | End: 2018-11-26
Attending: INTERNAL MEDICINE | Admitting: INTERNAL MEDICINE
Payer: COMMERCIAL

## 2018-11-26 DIAGNOSIS — I83.12 VARICOSE VEINS OF LEFT LOWER EXTREMITY WITH INFLAMMATION: ICD-10-CM

## 2018-11-26 DIAGNOSIS — I82.409 RECURRENT DEEP VEIN THROMBOSIS (DVT) (H): ICD-10-CM

## 2018-11-26 PROCEDURE — 93971 EXTREMITY STUDY: CPT | Mod: LT

## 2018-12-06 ENCOUNTER — OFFICE VISIT (OUTPATIENT)
Dept: VASCULAR SURGERY | Facility: CLINIC | Age: 48
End: 2018-12-06
Attending: INTERNAL MEDICINE
Payer: COMMERCIAL

## 2018-12-06 VITALS
DIASTOLIC BLOOD PRESSURE: 65 MMHG | BODY MASS INDEX: 23.22 KG/M2 | TEMPERATURE: 99.1 F | HEART RATE: 74 BPM | HEIGHT: 64 IN | WEIGHT: 136 LBS | SYSTOLIC BLOOD PRESSURE: 111 MMHG

## 2018-12-06 DIAGNOSIS — I83.12 VARICOSE VEINS OF LEFT LOWER EXTREMITY WITH INFLAMMATION: ICD-10-CM

## 2018-12-06 DIAGNOSIS — I82.409 RECURRENT DEEP VEIN THROMBOSIS (DVT) (H): ICD-10-CM

## 2018-12-06 DIAGNOSIS — D68.62 LUPUS ANTICOAGULANT DISORDER (H): ICD-10-CM

## 2018-12-06 DIAGNOSIS — D68.51 FACTOR V LEIDEN (H): ICD-10-CM

## 2018-12-06 PROCEDURE — 99214 OFFICE O/P EST MOD 30 MIN: CPT | Performed by: INTERNAL MEDICINE

## 2018-12-06 NOTE — PROGRESS NOTES
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  Patient has been on anticoagulation for a long time, her INR is to target    Physical Exam   Temp: 99.1  F (37.3  C) Temp src: Tympanic BP: 111/65 Pulse: 74            Vitals:    12/06/18 0858   Weight: 136 lb (61.7 kg)     Vital Signs with Ranges  Temp:  [99.1  F (37.3  C)] 99.1  F (37.3  C)  Pulse:  [74] 74  BP: (111)/(65) 111/65  [unfilled]    Constitutional: awake, alert, cooperative, no apparent distress, and appears stated age  Eyes: Lids and lashes normal, pupils equal, round and reactive to light, extra ocular muscles intact, sclera clear, conjunctiva normal  ENT: normocepalic, without obvious abnormality, oropharynx pink and moist  Hematologic / Lymphatic: no lymphadenopathy  Respiratory: No increased work of breathing, good air exchange, clear to auscultation bilaterally, no crackles or wheezing  Cardiovascular: regular rate and rhythm, normal S1 and S2 and no murmur noted  GI: Normal bowel sounds, soft, non-distended, non-tender  Skin: no redness, warmth, or swelling, no rashes and no lesions  Musculoskeletal: There is no redness, warmth, or swelling of the joints.  Full range of motion noted.  Motor strength is 5 out of 5 all extremities bilaterally.  Tone is normal.  Neurologic: Awake, alert, oriented to name, place and time.  Cranial nerves II-XII are grossly intact.  Motor is 5 out of 5 bilaterally.    Neuropsychiatric:  Normal affect, memory, insight.  Pulses: Palpable pulses  . No carotid bruits appreciated.      Medications         Data   No results found for this or any previous visit (from the past 24 hour(s)).    Assessment & Plan   (D68.51) Factor V Leiden (H)  Comment: Stable  Plan: Continue with anticoagulation    (D68.62) Lupus anticoagulant disorder (H)  Comment: Tested positive once  Plan: Continue with anticoagulation    (I82.409) Recurrent deep vein thrombosis (DVT) (H)  Comment: No evidence of acute DVT for now, history of recurrent DVT left lower extremity secondary to may Thurner  Plan: Continue wearing compression stockings, continue to exercise patient is a runner, continue with her routine    (I83.12) Varicose veins of left lower extremity with inflammation  Comment: Continue with compression treatment  Plan: Localized sclerotherapy left lower extremity above the ankle using 0.5% Polidoconol        Summary: We will see the patient once we arrange for sclerotherapy which hopefully will be before the end of the month    Jose F Mercedes MD

## 2018-12-06 NOTE — MR AVS SNAPSHOT
"              After Visit Summary   12/6/2018    Ignacio Cooper    MRN: [id number]           Patient Information     Date Of Birth          1970        Visit Information        Provider Department      12/6/2018 9:00 AM Jose F Mercedes MD [facility]        Today's Diagnoses     Factor V Leiden (H)        Lupus anticoagulant disorder (H)        Recurrent deep vein thrombosis (DVT) (H)        Varicose veins of left lower extremity with inflammation           Follow-ups after your visit        Follow-up notes from your care team     Return in about 4 weeks (around 1/3/2019).      Who to contact     If you have questions or need follow up information about today's clinic visit or your schedule please contact [facility] directly at [phone].  Normal or non-critical lab and imaging results will be communicated to you by MyChart, letter or phone within 4 business days after the clinic has received the results. If you do not hear from us within 7 days, please contact the clinic through MyChart or phone. If you have a critical or abnormal lab result, we will notify you by phone as soon as possible.  Submit refill requests through [employer] or call your pharmacy and they will forward the refill request to us. Please allow 3 business days for your refill to be completed.          Additional Information About Your Visit        Care EveryWhere ID     This is your Care EveryWhere ID. This could be used by other organizations to access your [place] medical records  [id number]        Your Vitals Were     Pulse Temperature Height BMI (Body Mass Index)          74 99.1  F (37.3  C) (Tympanic) 5' 4\" (1.626 m) 23.34 kg/m2         Blood Pressure from Last 3 Encounters:   12/06/18 111/65   11/08/18 107/65   11/01/18 100/62    Weight from Last 3 Encounters:   12/06/18 136 lb (61.7 kg)   11/08/18 136 lb (61.7 kg)   11/01/18 136 lb (61.7 kg)              We Performed the Following     Follow-Up " with Vascular Medicine        Primary Care Provider Office Phone # Fax #    [facility] [phone] [address]  [facility] [address]        Equal Access to Services     LU CISSE : Hadii loida ku bradleyo Sochrisali, waaxda luqadaha, qaybta kaalmada adesandhyayada, jon gallardocelsa skaggs. So [facility] [phone].    ATENCIÓN: Si habla español, tiene a rubi disposición servicios gratuitos de asistencia lingüística. Llame al [phone].    We comply with applicable federal civil rights laws and [place] laws. We do not discriminate on the basis of race, color, national origin, age, disability, sex, sexual orientation, or gender identity.            Thank you!     Thank you for choosing [facility]  for your care. Our goal is always to provide you with excellent care. Hearing back from our patients is one way we can continue to improve our services. Please take a few minutes to complete the written survey that you may receive in the mail after your visit with us. Thank you!             Your Updated Medication List - Protect others around you: Learn how to safely use, store and throw away your medicines at www.disposemymeds.org.          This list is accurate as of 12/6/18  9:24 AM.  Always use your most recent med list.                   Brand Name Dispense Instructions for use Diagnosis    * fluticasone-vilanterol 100-25 MCG/INH inhaler    BREO ELLIPTA    60 Inhaler    Inhale 1 puff into the lungs daily    Mild persistent asthma, unspecified whether complicated       * fluticasone-vilanterol 200-25 MCG/INH inhaler    BREO ELLIPTA    1 Inhaler    Inhale 1 puff into the lungs daily    Mild persistent asthma, unspecified whether complicated       warfarin 5 MG tablet    COUMADIN    1 tablet    Take 5 mg, then 5 mg, then 7.5 mg repeating every 3 days or as directed by the Anticoagulation Clinic        * Notice:  This list has 2 medication(s) that are the  same as other medications prescribed for you. Read the directions carefully, and ask your doctor or other care provider to review them with you.

## 2018-12-06 NOTE — NURSING NOTE
"Initial /65 (BP Location: Right arm, Patient Position: Sitting, Cuff Size: Adult Regular)  Pulse 74  Temp 99.1  F (37.3  C) (Tympanic)  Ht 1.626 m (5' 4\")  Wt 61.7 kg (136 lb)  BMI 23.34 kg/m2 Estimated body mass index is 23.34 kg/(m^2) as calculated from the following:    Height as of this encounter: 1.626 m (5' 4\").    Weight as of this encounter: 61.7 kg (136 lb). .    Fidelia Alves MA    "

## 2018-12-11 ENCOUNTER — ANTICOAGULATION THERAPY VISIT (OUTPATIENT)
Dept: ANTICOAGULATION | Facility: CLINIC | Age: 48
End: 2018-12-11
Payer: COMMERCIAL

## 2018-12-11 DIAGNOSIS — D68.51 FACTOR V LEIDEN (H): ICD-10-CM

## 2018-12-11 DIAGNOSIS — D68.62 LUPUS ANTICOAGULANT DISORDER (H): ICD-10-CM

## 2018-12-11 DIAGNOSIS — Z79.01 LONG TERM CURRENT USE OF ANTICOAGULANT THERAPY: ICD-10-CM

## 2018-12-11 DIAGNOSIS — I82.409 RECURRENT DEEP VEIN THROMBOSIS (DVT) (H): ICD-10-CM

## 2018-12-11 LAB — INR PPP: 2.24 (ref 0.86–1.14)

## 2018-12-11 PROCEDURE — 99207 ZZC NO CHARGE NURSE ONLY: CPT

## 2018-12-11 PROCEDURE — 36415 COLL VENOUS BLD VENIPUNCTURE: CPT | Performed by: FAMILY MEDICINE

## 2018-12-11 PROCEDURE — 85610 PROTHROMBIN TIME: CPT | Performed by: FAMILY MEDICINE

## 2018-12-11 NOTE — PROGRESS NOTES
ANTICOAGULATION FOLLOW-UP CLINIC VISIT    Patient Name:  Ignacio Cooper  Date:  2018  Contact Type:  Telephone    SUBJECTIVE:     Patient Findings     Positives:   No Problem Findings    Comments:   No changes in medications, diet, or activity. No concerns with bleeding or bruising. Took warfarin as prescribed.   Continue maintenance warfarin dose. Will recheck INR in 6 weeks.   Patient verbalizes understanding and agrees with plan. No further questions at this time.              OBJECTIVE    INR   Date Value Ref Range Status   2018 2.24 (H) 0.86 - 1.14 Final       ASSESSMENT / PLAN  INR assessment THER    Recheck INR In: 6 WEEKS    INR Location Clinic      Anticoagulation Summary  As of 2018    INR goal:   2.0-3.0   TTR:   100.0 % (4.1 wk)   INR used for dosin.24 (2018)   Warfarin maintenance plan:   5 mg (5 mg x 1), then 5 mg (5 mg x 1), then 7.5 mg (5 mg x 1.5) repeating every 3 days   Full warfarin instructions:   5 mg, then 5 mg, then 7.5 mg repeating every 3 days   Average weekly warfarin total:   40.31733638167936575 mg   No change documented:   Alexandria Ramon RN   Plan last modified:   Koki Ascencio RN (2018)   Next INR check:   2019   Priority:   INR   Target end date:       Indications    Lupus anticoagulant disorder (H) [D68.62]  Factor V Leiden (H) [D68.51]  Recurrent deep vein thrombosis (DVT) (H) [I82.409]             Anticoagulation Episode Summary     INR check location:       Preferred lab:       Send INR reminders to:   [place] [facility]    Comments:   *Patient switched to [facility] recently but has been taking warfarin for 19 years per patient. Recently moved to area.      Anticoagulation Care Providers     Provider Role Specialty Phone number    Fidelia Forbes MD [facility] [phone]            See the Encounter Report to view Anticoagulation Flowsheet and Dosing Calendar (Go to Encounters tab in chart review, and find  the Anticoagulation Therapy Visit)        Alexandria Ramon RN

## 2018-12-11 NOTE — PROGRESS NOTES
Called patient into office, pt states she prefers lab draw due to POC fingerstick too painful. Made pt lab appt and sent to lab. Alexandria Ramon RN on 12/11/2018 at 10:56 AM

## 2018-12-11 NOTE — ADDENDUM NOTE
Addended by: KANDICE MALIK on: 12/11/2018 03:37 PM     Modules accepted: Level of Service, SmartSet

## 2019-01-14 DIAGNOSIS — D68.62 LUPUS ANTICOAGULANT DISORDER (H): ICD-10-CM

## 2019-01-14 DIAGNOSIS — D68.51 FACTOR V LEIDEN (H): ICD-10-CM

## 2019-01-14 DIAGNOSIS — Z79.01 LONG TERM CURRENT USE OF ANTICOAGULANT THERAPY: ICD-10-CM

## 2019-01-14 DIAGNOSIS — I82.409 RECURRENT DEEP VEIN THROMBOSIS (DVT) (H): Primary | ICD-10-CM

## 2019-01-14 RX ORDER — WARFARIN SODIUM 5 MG/1
TABLET ORAL
Qty: 105 TABLET | Refills: 0 | Status: SHIPPED | OUTPATIENT
Start: 2019-01-14 | End: 2019-03-18

## 2019-01-14 NOTE — TELEPHONE ENCOUNTER
Current warfarin dose:  Full warfarin instructions:   5 mg, then 5 mg, then 7.5 mg repeating every 3 days   Average weekly warfarin total:   40.29653857408307451 mg         Last INR result:  INR   Date Value Ref Range Status   12/11/2018 2.24 (H) 0.86 - 1.14 Final       Refill authorized per [facility] protocol.    Ferdinand CAUSEY RN, CACP

## 2019-01-24 ENCOUNTER — ANTICOAGULATION THERAPY VISIT (OUTPATIENT)
Dept: ANTICOAGULATION | Facility: CLINIC | Age: 49
End: 2019-01-24

## 2019-01-24 DIAGNOSIS — Z79.01 LONG TERM CURRENT USE OF ANTICOAGULANT THERAPY: ICD-10-CM

## 2019-01-24 DIAGNOSIS — D68.51 FACTOR V LEIDEN (H): ICD-10-CM

## 2019-01-24 DIAGNOSIS — I82.409 RECURRENT DEEP VEIN THROMBOSIS (DVT) (H): ICD-10-CM

## 2019-01-24 DIAGNOSIS — D68.62 LUPUS ANTICOAGULANT DISORDER (H): ICD-10-CM

## 2019-01-24 LAB — INR PPP: 1.87 (ref 0.86–1.14)

## 2019-01-24 PROCEDURE — 85610 PROTHROMBIN TIME: CPT | Performed by: FAMILY MEDICINE

## 2019-01-24 PROCEDURE — 99207 ZZC NO CHARGE NURSE ONLY: CPT

## 2019-01-24 PROCEDURE — 36415 COLL VENOUS BLD VENIPUNCTURE: CPT | Performed by: FAMILY MEDICINE

## 2019-01-24 NOTE — PROGRESS NOTES
ANTICOAGULATION FOLLOW-UP CLINIC VISIT    Patient Name:  Ignacio Cooper  Date:  2019  Contact Type:  Telephone/ writer spoke with Ignacio on the phone    SUBJECTIVE:     Patient Findings     Positives:   Change in diet/appetite (had a salad with spinach a couple days ago)    Comments:   No changes in activity level, medications (including over the counter), or health. No missed doses of warfarin. Patient took dosing as prescribed. Patient denies any signs of clot or stroke. Due to clot hx and clotting disorder, will advise to increase today's dose, then resume usual dosing.   Writer did educate the patient on the need to maintain consistency with vit k foods and not eat spinach if she does not usually eat this. She did not want to schedule the next INR at this time.           OBJECTIVE    INR   Date Value Ref Range Status   2019 1.87 (H) 0.86 - 1.14 Final       ASSESSMENT / PLAN  INR assessment SUB    Recheck INR In: 6 WEEKS    INR Location Clinic lab     Anticoagulation Summary  As of 2019    INR goal:   2.0-3.0   TTR:   79.0 % (2.4 mo)   INR used for dosin.87! (2019)   Warfarin maintenance plan:   5 mg (5 mg x 1), then 5 mg (5 mg x 1), then 7.5 mg (5 mg x 1.5) repeating every 3 days   Full warfarin instructions:   : 7.5 mg; Otherwise 5 mg, then 5 mg, then 7.5 mg repeating every 3 days   Average weekly warfarin total:   40.833 mg   Plan last modified:   Sadie Olivas RN (2019)   Next INR check:   3/7/2019   Priority:   INR   Target end date:   Indefinite    Indications    Lupus anticoagulant disorder (H) [D68.62]  Factor V Leiden (H) [D68.51]  Recurrent deep vein thrombosis (DVT) (H) [I82.409]             Anticoagulation Episode Summary     INR check location:       Preferred lab:       Send INR reminders to:   [facility] [facility]    Comments:   *Patient switched to [facility] recently but has been taking warfarin for 19 years per patient. Recently moved to [facility].       Anticoagulation Care Providers     Provider Role Specialty Phone number    Fidelia Forbes MD [facility] [phone]            See the Encounter Report to view Anticoagulation Flowsheet and Dosing Calendar (Go to Encounters tab in chart review, and find the Anticoagulation Therapy Visit)        Sadie Olivas RN

## 2019-01-30 ENCOUNTER — TELEPHONE (OUTPATIENT)
Dept: OTHER | Facility: CLINIC | Age: 49
End: 2019-01-30

## 2019-03-07 ENCOUNTER — ANTICOAGULATION THERAPY VISIT (OUTPATIENT)
Dept: ANTICOAGULATION | Facility: CLINIC | Age: 49
End: 2019-03-07
Payer: COMMERCIAL

## 2019-03-07 DIAGNOSIS — D68.62 LUPUS ANTICOAGULANT DISORDER (H): ICD-10-CM

## 2019-03-07 DIAGNOSIS — D68.51 FACTOR V LEIDEN (H): ICD-10-CM

## 2019-03-07 DIAGNOSIS — Z79.01 LONG TERM CURRENT USE OF ANTICOAGULANT THERAPY: ICD-10-CM

## 2019-03-07 DIAGNOSIS — I82.409 RECURRENT DEEP VEIN THROMBOSIS (DVT) (H): ICD-10-CM

## 2019-03-07 LAB — INR PPP: 1.79 (ref 0.86–1.14)

## 2019-03-07 PROCEDURE — 36415 COLL VENOUS BLD VENIPUNCTURE: CPT | Performed by: FAMILY MEDICINE

## 2019-03-07 PROCEDURE — 99207 ZZC NO CHARGE NURSE ONLY: CPT

## 2019-03-07 PROCEDURE — 85610 PROTHROMBIN TIME: CPT | Performed by: FAMILY MEDICINE

## 2019-03-07 NOTE — PROGRESS NOTES
Non detailed message left for patient to call [facility] back. Need to assess for low INR and to see if she was consistent with greens (see prior [facility] notes) or missed any doses.     If not, she may need an increase in maintenance dose at this time.    Sadie Olivas, RN, BSN, PHN

## 2019-03-08 NOTE — PROGRESS NOTES
ANTICOAGULATION FOLLOW-UP CLINIC VISIT    Patient Name:  Ignacio Cooper  Date:  3/8/2019  Contact Type:  Telephone    SUBJECTIVE:     Patient Findings     Positives:   Activity level change (Running)    Comments:   No changes in medications, activity, or diet noted. No bleeding or increased bruising noted. Took warfarin as prescribed.  Patient is running more which could be a possibility for the decrease in the INR.  Patient will take 7.5 mg today.   Recheck the INR in 3 weeks.   Patient verbalizes understanding and agrees to plan. No further questions or concerns.                OBJECTIVE    INR   Date Value Ref Range Status   2019 1.79 (H) 0.86 - 1.14 Final       ASSESSMENT / PLAN  INR assessment SUB    Recheck INR In: 3 WEEKS    INR Location Outside lab      Anticoagulation Summary  As of 3/7/2019    INR goal:   2.0-3.0   TTR:   50.2 % (3.8 mo)   INR used for dosin.79! (3/7/2019)   Warfarin maintenance plan:   5 mg (5 mg x 1), then 5 mg (5 mg x 1), then 7.5 mg (5 mg x 1.5) repeating every 3 days   Full warfarin instructions:   3/7: 7.5 mg; Otherwise 5 mg, then 5 mg, then 7.5 mg repeating every 3 days   Average weekly warfarin total:   40.833 mg   Plan last modified:   Sadie Olivas RN (2019)   Next INR check:   3/28/2019   Priority:   INR   Target end date:       Indications    Lupus anticoagulant disorder (H) [D68.62]  Factor V Leiden (H) [D68.51]  Recurrent deep vein thrombosis (DVT) (H) [I82.409]             Anticoagulation Episode Summary     INR check location:       Preferred lab:       Send INR reminders to:   [place] PHONE [email or web address]    Comments:   *Patient switched to [facility] recently but has been taking warfarin for 19 years per patient. Recently moved to area.      Anticoagulation Care Providers     Provider Role Specialty Phone number    Fidelia Forbes MD [facility] [phone]            See the Encounter Report to view Anticoagulation Flowsheet and Dosing  Calendar (Go to Encounters tab in chart review, and find the Anticoagulation Therapy Visit)        Lyle Bay RN

## 2019-03-08 NOTE — ADDENDUM NOTE
Addended by: CHARMAINE HE on: 3/8/2019 10:43 AM     Modules accepted: Level of Service, SmartSet

## 2019-03-18 DIAGNOSIS — I82.409 RECURRENT DEEP VEIN THROMBOSIS (DVT) (H): ICD-10-CM

## 2019-03-18 DIAGNOSIS — Z79.01 LONG TERM CURRENT USE OF ANTICOAGULANT THERAPY: ICD-10-CM

## 2019-03-18 DIAGNOSIS — D68.51 FACTOR V LEIDEN (H): ICD-10-CM

## 2019-03-18 DIAGNOSIS — D68.62 LUPUS ANTICOAGULANT DISORDER (H): ICD-10-CM

## 2019-03-18 RX ORDER — WARFARIN SODIUM 5 MG/1
TABLET ORAL
Qty: 105 TABLET | Refills: 0 | Status: SHIPPED | OUTPATIENT
Start: 2019-03-18 | End: 2019-09-20

## 2019-03-18 NOTE — TELEPHONE ENCOUNTER
Current warfarin dose:  Full warfarin instructions:   3/7: 7.5 mg; Otherwise 5 mg, then 5 mg, then 7.5 mg repeating every 3 days   Average weekly warfarin total:   40.833 mg   Next INR check:   3/28/2019         Last INR result:  INR   Date Value Ref Range Status   03/07/2019 1.79 (H) 0.86 - 1.14 Final       Refill authorized per [facility] protocol.    Ferdinand CAUSEY RN, CACP

## 2019-03-28 ENCOUNTER — OFFICE VISIT (OUTPATIENT)
Dept: VASCULAR SURGERY | Facility: CLINIC | Age: 49
End: 2019-03-28
Attending: INTERNAL MEDICINE
Payer: COMMERCIAL

## 2019-03-28 ENCOUNTER — ANTICOAGULATION THERAPY VISIT (OUTPATIENT)
Dept: ANTICOAGULATION | Facility: CLINIC | Age: 49
End: 2019-03-28

## 2019-03-28 VITALS
TEMPERATURE: 98.4 F | HEIGHT: 64 IN | BODY MASS INDEX: 23.22 KG/M2 | DIASTOLIC BLOOD PRESSURE: 61 MMHG | WEIGHT: 136 LBS | HEART RATE: 68 BPM | SYSTOLIC BLOOD PRESSURE: 111 MMHG

## 2019-03-28 DIAGNOSIS — D68.62 LUPUS ANTICOAGULANT DISORDER (H): ICD-10-CM

## 2019-03-28 DIAGNOSIS — I82.409 RECURRENT DEEP VEIN THROMBOSIS (DVT) (H): ICD-10-CM

## 2019-03-28 DIAGNOSIS — D68.51 FACTOR V LEIDEN (H): ICD-10-CM

## 2019-03-28 DIAGNOSIS — Z79.01 LONG TERM CURRENT USE OF ANTICOAGULANT THERAPY: ICD-10-CM

## 2019-03-28 DIAGNOSIS — I83.12 VARICOSE VEINS OF LEFT LOWER EXTREMITY WITH INFLAMMATION: ICD-10-CM

## 2019-03-28 LAB — INR PPP: 2.13 (ref 0.86–1.14)

## 2019-03-28 PROCEDURE — 85610 PROTHROMBIN TIME: CPT | Performed by: FAMILY MEDICINE

## 2019-03-28 PROCEDURE — 99213 OFFICE O/P EST LOW 20 MIN: CPT | Mod: 25 | Performed by: INTERNAL MEDICINE

## 2019-03-28 PROCEDURE — 99207 ZZC NO CHARGE NURSE ONLY: CPT

## 2019-03-28 PROCEDURE — 36415 COLL VENOUS BLD VENIPUNCTURE: CPT | Performed by: FAMILY MEDICINE

## 2019-03-28 PROCEDURE — 36470 NJX SCLRSNT 1 INCMPTNT VEIN: CPT | Performed by: INTERNAL MEDICINE

## 2019-03-28 ASSESSMENT — MIFFLIN-ST. JEOR: SCORE: 1236.89

## 2019-03-28 NOTE — PROGRESS NOTES
ANTICOAGULATION FOLLOW-UP CLINIC VISIT    Patient Name:  Ignacio Cooper  Date:  3/28/2019  Contact Type:  Telephone/ Spoke to pt    SUBJECTIVE:     Patient Findings     Comments:   No changes in medications, activity, or diet noted. No bleeding or increased bruising noted. Took warfarin as prescribed.  Patient is to continue maintenance warfarin plan, and check INR in 6 weeks.  Patient verbalizes understanding and agrees to plan. No further questions or concerns.           OBJECTIVE    INR   Date Value Ref Range Status   2019 2.13 (H) 0.86 - 1.14 Final       ASSESSMENT / PLAN  INR assessment THER    Recheck INR In: 6 WEEKS    INR Location Outside lab      Anticoagulation Summary  As of 3/28/2019    INR goal:   2.0-3.0   TTR:   48.4 % (4.5 mo)   INR used for dosin.13 (3/28/2019)   Warfarin maintenance plan:   5 mg (5 mg x 1), then 5 mg (5 mg x 1), then 7.5 mg (5 mg x 1.5) repeating every 3 days   Full warfarin instructions:   5 mg, then 5 mg, then 7.5 mg repeating every 3 days   Average weekly warfarin total:   40.833 mg   No change documented:   Martha Schilling, RN   Plan last modified:   Sadie Olivas RN (2019)   Next INR check:   2019   Priority:   INR   Target end date:   Indefinite    Indications    Lupus anticoagulant disorder (H) [D68.62]  Factor V Leiden (H) [D68.51]  Recurrent deep vein thrombosis (DVT) (H) [I82.409]             Anticoagulation Episode Summary     INR check location:       Preferred lab:       Send INR reminders to:   [place] [facility]    Comments:   *Patient switched to [facility] recently but has been taking warfarin for 19 years per patient. Recently moved to area.      Anticoagulation Care Providers     Provider Role Specialty Phone number    Fidelia Forbes MD [facility] [phone]            See the Encounter Report to view Anticoagulation Flowsheet and Dosing Calendar (Go to Encounters tab in chart review, and find the Anticoagulation  Therapy Visit)        Martha Schilling RN

## 2019-03-28 NOTE — PROGRESS NOTES
Vascular Medicine Progress Note     Ignacio Cooper is a 49 year old female who is here for sclerotherapy for symptomatic varicose vein    Interval History   Patient is here for sclerotherapy for symptomatic varicose veins, patient has no reflux, used 2 cc of polidocanol 0.5% which is equivalent to 10 mg of polidocanol  Was guided with velight, did not use ultrasound    No pain was felt no burning sensation no extravasation of the solution outside the vein  Procedure went well  There were no contraindications to the use of polidocanol    Physical Exam   Temp: 98.4  F (36.9  C) Temp src: Oral BP: 111/61 Pulse: 68            Vitals:    03/28/19 0935   Weight: 136 lb (61.7 kg)     Vital Signs with Ranges  Temp:  [98.4  F (36.9  C)] 98.4  F (36.9  C)  Pulse:  [68] 68  BP: (111)/(61) 111/61  [unfilled]    Constitutional: awake, alert, cooperative, no apparent distress, and appears stated age  Eyes: Lids and lashes normal, pupils equal, round and reactive to light, extra ocular muscles intact, sclera clear, conjunctiva normal  ENT: normocepalic, without obvious abnormality, oropharynx pink and moist  Hematologic / Lymphatic: no lymphadenopathy  Respiratory: No increased work of breathing, good air exchange, clear to auscultation bilaterally, no crackles or wheezing  Cardiovascular: regular rate and rhythm, normal S1 and S2 and no murmur noted  GI: Normal bowel sounds, soft, non-distended, non-tender  Skin: no redness, warmth, or swelling, no rashes and no lesions  Musculoskeletal: There is no redness, warmth, or swelling of the joints.  Full range of motion noted.  Motor strength is 5 out of 5 all extremities bilaterally.  Tone is normal.  Neurologic: Awake, alert, oriented to name, place and time.  Cranial nerves II-XII are grossly intact.  Motor is 5 out of 5 bilaterally.    Neuropsychiatric:  Normal affect, memory, insight.  Pulses: Intact  . No carotid bruits appreciated.     Medications         Data   No  results found for this or any previous visit (from the past 24 hour(s)).    Assessment & Plan   (D68.51) Factor V Leiden (H)  Comment: Stable      (D68.62) Lupus anticoagulant disorder (H)  Comment: Stable      (I82.409) Recurrent deep vein thrombosis (DVT) (H)  Comment: No evidence of DVT or SVT      (I83.12) Varicose veins of left lower extremity with inflammation  Comment: Sclerotherapy for symptomatic varicose vein  Plan: Procedure done with no complications  Follow-up with the patient in 1 week  Patient is to wear compression stockings as instructed          Jose F Mercedes MD

## 2019-03-28 NOTE — NURSING NOTE
"Initial /61 (BP Location: Left arm, Patient Position: Sitting, Cuff Size: Adult Regular)   Pulse 68   Temp 98.4  F (36.9  C) (Oral)   Ht 1.626 m (5' 4\")   Wt 61.7 kg (136 lb)   BMI 23.34 kg/m   Estimated body mass index is 23.34 kg/m  as calculated from the following:    Height as of this encounter: 1.626 m (5' 4\").    Weight as of this encounter: 61.7 kg (136 lb). .    Fidelia Alves MA    "

## 2019-04-04 ENCOUNTER — OFFICE VISIT (OUTPATIENT)
Dept: VASCULAR SURGERY | Facility: CLINIC | Age: 49
End: 2019-04-04
Attending: INTERNAL MEDICINE
Payer: COMMERCIAL

## 2019-04-04 VITALS
WEIGHT: 136 LBS | TEMPERATURE: 98.2 F | DIASTOLIC BLOOD PRESSURE: 68 MMHG | SYSTOLIC BLOOD PRESSURE: 111 MMHG | HEIGHT: 64 IN | BODY MASS INDEX: 23.22 KG/M2 | HEART RATE: 67 BPM

## 2019-04-04 DIAGNOSIS — I83.12 VARICOSE VEINS OF LEFT LOWER EXTREMITY WITH INFLAMMATION: ICD-10-CM

## 2019-04-04 DIAGNOSIS — D68.62 LUPUS ANTICOAGULANT DISORDER (H): ICD-10-CM

## 2019-04-04 DIAGNOSIS — D68.51 FACTOR V LEIDEN (H): ICD-10-CM

## 2019-04-04 DIAGNOSIS — I82.409 RECURRENT DEEP VEIN THROMBOSIS (DVT) (H): ICD-10-CM

## 2019-04-04 PROCEDURE — 99213 OFFICE O/P EST LOW 20 MIN: CPT | Performed by: INTERNAL MEDICINE

## 2019-04-04 ASSESSMENT — MIFFLIN-ST. JEOR: SCORE: 1236.89

## 2019-04-04 NOTE — PROGRESS NOTES
Vascular Medicine Progress Note     Ignacio Cooper is a 49 year old female who is here status post sclerotherapy to varicose veins above the ankle left foot    Interval History   Varicose veins looks less prominent yet still there is port that needs most probably another session of sclerotherapy    Patient will need to wait for another week to see if there is any residual varicose veins in the same area if so she will need sclerotherapy    Physical Exam   Temp: 98.2  F (36.8  C) Temp src: Tympanic BP: 111/68 Pulse: 67            Vitals:    04/04/19 1044   Weight: 136 lb (61.7 kg)     Vital Signs with Ranges  Temp:  [98.2  F (36.8  C)] 98.2  F (36.8  C)  Pulse:  [67] 67  BP: (111)/(68) 111/68  [unfilled]    Constitutional: awake, alert, cooperative, no apparent distress, and appears stated age  Eyes: Lids and lashes normal, pupils equal, round and reactive to light, extra ocular muscles intact, sclera clear, conjunctiva normal  ENT: normocepalic, without obvious abnormality, oropharynx pink and moist  Hematologic / Lymphatic: no lymphadenopathy  Respiratory: No increased work of breathing, good air exchange, clear to auscultation bilaterally, no crackles or wheezing  Cardiovascular: regular rate and rhythm, normal S1 and S2 and no murmur noted  GI: Normal bowel sounds, soft, non-distended, non-tender  Skin: no redness, warmth, or swelling, no rashes and no lesions  Musculoskeletal: There is no redness, warmth, or swelling of the joints.  Full range of motion noted.  Motor strength is 5 out of 5 all extremities bilaterally.  Tone is normal.  Neurologic: Awake, alert, oriented to name, place and time.  Cranial nerves II-XII are grossly intact.  Motor is 5 out of 5 bilaterally.    Neuropsychiatric:  Normal affect, memory, insight.  Pulses: Intact  . No carotid bruits appreciated.     Medications         Data   No results found for this or any previous visit (from the past 24 hour(s)).    Assessment & Plan      (I83.12) Varicose veins of left lower extremity with inflammation  Comment: Residual varicose veins left lower extremity  Plan: Repeat sclerotherapy          Jose F Mercedes MD

## 2019-04-04 NOTE — NURSING NOTE
"Initial /68 (BP Location: Right arm, Patient Position: Sitting, Cuff Size: Adult Regular)   Pulse 67   Temp 98.2  F (36.8  C) (Tympanic)   Ht 1.626 m (5' 4\")   Wt 61.7 kg (136 lb)   BMI 23.34 kg/m   Estimated body mass index is 23.34 kg/m  as calculated from the following:    Height as of this encounter: 1.626 m (5' 4\").    Weight as of this encounter: 61.7 kg (136 lb). .    Fidelia Alves MA    "

## 2019-04-18 ENCOUNTER — OFFICE VISIT (OUTPATIENT)
Dept: VASCULAR SURGERY | Facility: CLINIC | Age: 49
End: 2019-04-18
Payer: COMMERCIAL

## 2019-04-18 VITALS
SYSTOLIC BLOOD PRESSURE: 109 MMHG | TEMPERATURE: 98.3 F | BODY MASS INDEX: 23.22 KG/M2 | DIASTOLIC BLOOD PRESSURE: 65 MMHG | HEIGHT: 64 IN | HEART RATE: 75 BPM | WEIGHT: 136 LBS

## 2019-04-18 DIAGNOSIS — I83.12 VARICOSE VEINS OF LEFT LOWER EXTREMITY WITH INFLAMMATION: Primary | ICD-10-CM

## 2019-04-18 PROCEDURE — 99214 OFFICE O/P EST MOD 30 MIN: CPT | Performed by: INTERNAL MEDICINE

## 2019-04-18 ASSESSMENT — MIFFLIN-ST. JEOR: SCORE: 1236.89

## 2019-04-18 NOTE — PROGRESS NOTES
Vascular Medicine Progress Note     Ignacio Cooper is a 49 year old female who is here for F/U on sclerotherapy    Interval History   Patient had a session of sclerotherapy a week ago, patient still symptomatic from one particular vein that needs to be redone    Physical Exam   Temp: 98.3  F (36.8  C) Temp src: Tympanic BP: 109/65 Pulse: 75            Vitals:    04/18/19 1132   Weight: 136 lb (61.7 kg)     Vital Signs with Ranges  Temp:  [98.3  F (36.8  C)] 98.3  F (36.8  C)  Pulse:  [75] 75  BP: (109)/(65) 109/65  [unfilled]    Constitutional: awake, alert, cooperative, no apparent distress, and appears stated age  Eyes: Lids and lashes normal, pupils equal, round and reactive to light, extra ocular muscles intact, sclera clear, conjunctiva normal  ENT: normocepalic, without obvious abnormality, oropharynx pink and moist  Hematologic / Lymphatic: no lymphadenopathy  Respiratory: No increased work of breathing, good air exchange, clear to auscultation bilaterally, no crackles or wheezing  Cardiovascular: regular rate and rhythm, normal S1 and S2 and no murmur noted  GI: Normal bowel sounds, soft, non-distended, non-tender  Skin: no redness, warmth, or swelling, no rashes and no lesions  Musculoskeletal: There is no redness, warmth, or swelling of the joints.  Full range of motion noted.  Motor strength is 5 out of 5 all extremities bilaterally.  Tone is normal.  Neurologic: Awake, alert, oriented to name, place and time.  Cranial nerves II-XII are grossly intact.  Motor is 5 out of 5 bilaterally.    Neuropsychiatric:  Normal affect, memory, insight.  Pulses: Intact. No carotid bruits appreciated.     Medications         Data   No results found for this or any previous visit (from the past 24 hour(s)).    Assessment & Plan   No diagnosis found.      Summary: Symptomatic varicose vein that needed redo sclerotherapy which was done    Jose F Mercedes MD

## 2019-04-18 NOTE — NURSING NOTE
"Initial /65 (BP Location: Right arm, Patient Position: Sitting, Cuff Size: Adult Regular)   Pulse 75   Temp 98.3  F (36.8  C) (Tympanic)   Ht 1.626 m (5' 4\")   Wt 61.7 kg (136 lb)   BMI 23.34 kg/m   Estimated body mass index is 23.34 kg/m  as calculated from the following:    Height as of this encounter: 1.626 m (5' 4\").    Weight as of this encounter: 61.7 kg (136 lb). .    Fidelia Alves MA    "

## 2019-05-06 ENCOUNTER — OFFICE VISIT (OUTPATIENT)
Dept: FAMILY MEDICINE | Facility: CLINIC | Age: 49
End: 2019-05-06
Payer: COMMERCIAL

## 2019-05-06 VITALS
TEMPERATURE: 98.4 F | HEART RATE: 70 BPM | OXYGEN SATURATION: 98 % | SYSTOLIC BLOOD PRESSURE: 112 MMHG | WEIGHT: 137 LBS | HEIGHT: 64 IN | RESPIRATION RATE: 16 BRPM | DIASTOLIC BLOOD PRESSURE: 70 MMHG | BODY MASS INDEX: 23.39 KG/M2

## 2019-05-06 DIAGNOSIS — R07.0 THROAT PAIN: ICD-10-CM

## 2019-05-06 DIAGNOSIS — J02.0 ACUTE STREPTOCOCCAL PHARYNGITIS: Primary | ICD-10-CM

## 2019-05-06 LAB
DEPRECATED S PYO AG THROAT QL EIA: NORMAL
INR PPP: 3.47 (ref 0.86–1.14)
SPECIMEN SOURCE: NORMAL

## 2019-05-06 PROCEDURE — 85610 PROTHROMBIN TIME: CPT | Performed by: NURSE PRACTITIONER

## 2019-05-06 PROCEDURE — 36415 COLL VENOUS BLD VENIPUNCTURE: CPT | Performed by: NURSE PRACTITIONER

## 2019-05-06 PROCEDURE — 99213 OFFICE O/P EST LOW 20 MIN: CPT | Performed by: NURSE PRACTITIONER

## 2019-05-06 PROCEDURE — 87081 CULTURE SCREEN ONLY: CPT | Performed by: NURSE PRACTITIONER

## 2019-05-06 PROCEDURE — 87880 STREP A ASSAY W/OPTIC: CPT | Performed by: NURSE PRACTITIONER

## 2019-05-06 RX ORDER — AMOXICILLIN 500 MG/1
500 CAPSULE ORAL 2 TIMES DAILY
Qty: 20 CAPSULE | Refills: 0 | Status: SHIPPED | OUTPATIENT
Start: 2019-05-06 | End: 2019-10-08

## 2019-05-06 ASSESSMENT — MIFFLIN-ST. JEOR: SCORE: 1241.43

## 2019-05-06 NOTE — PROGRESS NOTES
SUBJECTIVE:   Ignacio Cooper is a 49 year old female who presents to clinic today for the following   health issues:    ENT Symptoms             Symptoms: cc Present Absent Comment   Fever/Chills   x    Fatigue   x    Muscle Aches  x     Eye Irritation   x    Sneezing  x     Nasal Raoul/Drg   x    Sinus Pressure/Pain   x    Loss of smell   x    Dental pain   x    Sore Throat  x     Swollen Glands  x     Ear Pain/Fullness  x     Cough  x     Wheeze   x    Chest Pain   x    Shortness of breath   x    Rash   x    Other   x      Symptom duration:  two days    Symptom severity:  moderate   Treatments tried:  lemon honey water, airborne   Contacts:  none           Additional history: as documented    Reviewed  and updated as needed this visit by clinical staff         Reviewed and updated as needed this visit by Provider         Patient Active Problem List   Diagnosis     Factor V Leiden (H)     Lupus anticoagulant disorder (H)     Recurrent deep vein thrombosis (DVT) (H)     Long term current use of anticoagulant therapy     Mild persistent asthma, unspecified whether complicated     Past Surgical History:   Procedure Laterality Date     C SC LIGATION, DIVISION AND STRIPPING OF VEIN, LOWER EXN       iliac vein stent       OPEN REDUCTION INTERNAL FIXATION WRIST         Social History     Tobacco Use     Smoking status: Never Smoker     Smokeless tobacco: Never Used   Substance Use Topics     Alcohol use: No     Family History   Problem Relation Age of Onset     Lupus Mother      Breast Cancer Mother      Blood Disease Mother         Factor 5     Heart Disease Mother      Diabetes Father      Asthma Father          Current Outpatient Medications   Medication Sig Dispense Refill     fluticasone-vilanterol (BREO ELLIPTA) 200-25 MCG/INH inhaler Inhale 1 puff into the lungs daily 1 Inhaler 11     warfarin (COUMADIN) 5 MG tablet Take 5 mg, then 5 mg, then 7.5 mg repeating every 3 days or as directed by the Anticoagulation  "Clinic 105 tablet 0     Allergies   Allergen Reactions     Codeine      No lab results found.   BP Readings from Last 3 Encounters:   05/06/19 112/70   04/18/19 109/65   04/04/19 111/68    Wt Readings from Last 3 Encounters:   05/06/19 62.1 kg (137 lb)   04/18/19 61.7 kg (136 lb)   04/04/19 61.7 kg (136 lb)                    ROS:  Constitutional, HEENT, cardiovascular, pulmonary, gi and gu systems are negative, except as otherwise noted.    OBJECTIVE:     /70 (BP Location: Right arm, Cuff Size: Adult Regular)   Pulse 70   Temp 98.4  F (36.9  C) (Tympanic)   Resp 16   Ht 1.626 m (5' 4\")   Wt 62.1 kg (137 lb)   SpO2 98%   BMI 23.52 kg/m    Body mass index is 23.52 kg/m .  GENERAL: healthy, alert and no distress  EYES: Eyes grossly normal to inspection, PERRL and conjunctivae and sclerae normal  HENT: ear canals and TM's normal, nose and mouth without ulcers or lesions  HENT: Erythema to the tonsils with exudate  NECK: no adenopathy, no asymmetry, masses, or scars and thyroid normal to palpation  RESP: lungs clear to auscultation - no rales, rhonchi or wheezes  CV: regular rate and rhythm, normal S1 S2, no S3 or S4, no murmur, click or rub, no peripheral edema and peripheral pulses strong  ABDOMEN: soft, nontender, no hepatosplenomegaly, no masses and bowel sounds normal  MS: no gross musculoskeletal defects noted, no edema  SKIN: no suspicious lesions or rashes  NEURO: Normal strength and tone, mentation intact and speech normal  PSYCH: mentation appears normal, affect normal/bright  Results for orders placed or performed in visit on 05/06/19   INR   Result Value Ref Range    INR 3.47 (H) 0.86 - 1.14   Rapid strep screen   Result Value Ref Range    Specimen Description Throat     Rapid Strep A Screen       NEGATIVE: No Group A streptococcal antigen detected by immunoassay, await culture report.   Beta strep group A culture   Result Value Ref Range    Specimen Description Throat     Culture Micro No beta " hemolytic Streptococcus Group A isolated          ASSESSMENT/PLAN:   (J02.0) Acute streptococcal pharyngitis  (primary encounter diagnosis)  Comment: Due to patient clinical presentation and symptoms will treat with a course of amoxicillin  Plan: amoxicillin (AMOXIL) 500 MG capsule, INR      (R07.0) Throat pain  Comment:   Plan: Rapid strep screen, Beta strep group A culture            CHINTAN Ramos [facility]

## 2019-05-06 NOTE — PATIENT INSTRUCTIONS
Patient Education     Pharyngitis: Strep (Presumed)    You have pharyngitis (sore throat). The healthcare staff think your sore throat is caused by streptococcus (strep) bacteria. This is often called strep throat. Strep throat can cause throat pain that is worse when swallowing, aching all over, headache, and fever. The infection is contagious. It may be spread by coughing, kissing, or touching others after touching your mouth or nose. Antibiotic medicine is given to treat the infection.  Home care    Rest at home. Drink plenty of fluids so you won t get dehydrated.    Stay home from work or school for the first 2 days of taking the antibiotics. After this time, you will not be contagious. You can then return to work or school if you are feeling better.     Take the antibiotic medicine for the full 10 days, even when you feel better. This is very important to make sure the infection is fully treated. It is also important to prevent medicine-resistant germs from growing. If you were given an antibiotic shot, no more antibiotics are needed.    You may use acetaminophen or ibuprofen to control pain or fever, unless another medicine was prescribed for this. If you have chronic liver or kidney disease or ever had a stomach ulcer or GI bleeding, talk with your healthcare provider before using these medicines.    Use throat lozenges or a throat-numbing spray to help reduce throat pain. Gargling with warm salt water can also help reduce throat pain. Dissolve 1/2 teaspoon of salt in 1 glass of warm water.     Don t eat salty or spicy foods. These can irritate the throat.  Follow-up care  Follow up with your healthcare provider or our staff if you don't get better over the next week.  When to seek medical advice  Call your healthcare provider right away if any of these occur:    Fever as directed by your healthcare provider    New or worse ear pain, sinus pain, or headache    Painful lumps in the back of neck    Stiff  neck    Lymph nodes that get larger    Can t swallow liquids, a lot of drooling, or can t open mouth wide due to throat pain    Signs of dehydration, such as very dark urine or no urine, sunken eyes, dizziness    Trouble breathing or noisy breathing    Muffled voice    New rash  Prevention  Here are steps you can take to help prevent an infection:    Keep good hand washing habits.    Don t have close contact with people who have sore throats, colds, or other upper respiratory infections.    Don t smoke, and stay away from secondhand smoke.    Stay up to date with of your vaccines.  Date Last Reviewed: 11/1/2017 2000-2018 [employer]. [address] [place] [address], [place] [address]. All rights reserved. This information is not intended as a substitute for professional medical care. Always follow your healthcare professional's instructions.

## 2019-05-07 ENCOUNTER — ANTICOAGULATION THERAPY VISIT (OUTPATIENT)
Dept: ANTICOAGULATION | Facility: CLINIC | Age: 49
End: 2019-05-07
Payer: COMMERCIAL

## 2019-05-07 DIAGNOSIS — D68.51 FACTOR V LEIDEN (H): ICD-10-CM

## 2019-05-07 DIAGNOSIS — I82.409 RECURRENT DEEP VEIN THROMBOSIS (DVT) (H): ICD-10-CM

## 2019-05-07 DIAGNOSIS — D68.62 LUPUS ANTICOAGULANT DISORDER (H): ICD-10-CM

## 2019-05-07 LAB
BACTERIA SPEC CULT: NORMAL
SPECIMEN SOURCE: NORMAL

## 2019-05-07 PROCEDURE — 99207 ZZC NO CHARGE NURSE ONLY: CPT

## 2019-05-07 NOTE — PROGRESS NOTES
"ANTICOAGULATION FOLLOW-UP CLINIC VISIT    Patient Name:  Ignacio Cooper  Date:  5/7/2019  Contact Type:  Telephone    SUBJECTIVE:     Patient Findings     Positives:   Change in medications (amoxicillin)    Comments:   No changes in diet, or activity. No concerns with bleeding or bruising. Took warfarin as prescribed.   Pt started on amoxicillin 5/6/19, per [employer], \"Concurrent use of AMOXICILLIN and WARFARIN may result in an increased risk of bleeding\".   Instructed patient to take 5 mg today, 5 mg 5/8/19, recheck INR 5/9/19.  Patient verbalizes understanding and agrees with plan. No further questions at this time.              OBJECTIVE    INR   Date Value Ref Range Status   05/06/2019 3.47 (H) 0.86 - 1.14 Final       ASSESSMENT / PLAN  INR assessment SUPRA    Recheck INR In: 3 DAYS    INR Location Outside lab      Anticoagulation Summary  As of 5/7/2019    INR goal:   2.0-3.0   TTR:   52.1 % (5.8 mo)   INR used for dosing:   3.47! (5/6/2019)   Warfarin maintenance plan:   5 mg (5 mg x 1), then 5 mg (5 mg x 1), then 7.5 mg (5 mg x 1.5) repeating every 3 days   Full warfarin instructions:   5/7: 2.5 mg; Otherwise 5 mg, then 5 mg, then 7.5 mg repeating every 3 days   Average weekly warfarin total:   40.833 mg   Plan last modified:   Sadie Olivas RN (1/24/2019)   Next INR check:   5/9/2019   Priority:   INR   Target end date:   Indefinite    Indications    Lupus anticoagulant disorder (H) [D68.62]  Factor V Leiden (H) [D68.51]  Recurrent deep vein thrombosis (DVT) (H) [I82.409]             Anticoagulation Episode Summary     INR check location:       Preferred lab:       Send INR reminders to:   [place] PHONE [facility]    Comments:   *Patient switched to [facility] recently but has been taking warfarin for 19 years per patient. Recently moved to area.      Anticoagulation Care Providers     Provider Role Specialty Phone number    Fidelia Forbes MD [facility] [phone]            See the " Encounter Report to view Anticoagulation Flowsheet and Dosing Calendar (Go to Encounters tab in chart review, and find the Anticoagulation Therapy Visit)    Dosage adjustment made based on physician directed care plan.    Alexandria Ramon RN

## 2019-05-09 ENCOUNTER — ANTICOAGULATION THERAPY VISIT (OUTPATIENT)
Dept: ANTICOAGULATION | Facility: CLINIC | Age: 49
End: 2019-05-09

## 2019-05-09 ENCOUNTER — OFFICE VISIT (OUTPATIENT)
Dept: VASCULAR SURGERY | Facility: CLINIC | Age: 49
End: 2019-05-09
Attending: INTERNAL MEDICINE
Payer: COMMERCIAL

## 2019-05-09 VITALS
HEART RATE: 82 BPM | DIASTOLIC BLOOD PRESSURE: 66 MMHG | SYSTOLIC BLOOD PRESSURE: 118 MMHG | BODY MASS INDEX: 23.39 KG/M2 | HEIGHT: 64 IN | WEIGHT: 137 LBS | TEMPERATURE: 98.4 F

## 2019-05-09 DIAGNOSIS — D68.62 LUPUS ANTICOAGULANT DISORDER (H): ICD-10-CM

## 2019-05-09 DIAGNOSIS — I83.12 VARICOSE VEINS OF LEFT LOWER EXTREMITY WITH INFLAMMATION: ICD-10-CM

## 2019-05-09 DIAGNOSIS — D68.51 FACTOR V LEIDEN (H): Primary | ICD-10-CM

## 2019-05-09 DIAGNOSIS — D68.51 FACTOR V LEIDEN (H): ICD-10-CM

## 2019-05-09 DIAGNOSIS — I82.409 RECURRENT DEEP VEIN THROMBOSIS (DVT) (H): ICD-10-CM

## 2019-05-09 DIAGNOSIS — Z79.01 LONG TERM CURRENT USE OF ANTICOAGULANT THERAPY: ICD-10-CM

## 2019-05-09 LAB
D DIMER PPP FEU-MCNC: <0.3 UG/ML FEU (ref 0–0.5)
INR PPP: 3.33 (ref 0.86–1.14)

## 2019-05-09 PROCEDURE — 99214 OFFICE O/P EST MOD 30 MIN: CPT | Performed by: INTERNAL MEDICINE

## 2019-05-09 PROCEDURE — 99207 ZZC NO CHARGE NURSE ONLY: CPT

## 2019-05-09 PROCEDURE — 36415 COLL VENOUS BLD VENIPUNCTURE: CPT | Performed by: FAMILY MEDICINE

## 2019-05-09 PROCEDURE — 85379 FIBRIN DEGRADATION QUANT: CPT | Performed by: INTERNAL MEDICINE

## 2019-05-09 PROCEDURE — 85610 PROTHROMBIN TIME: CPT | Performed by: FAMILY MEDICINE

## 2019-05-09 ASSESSMENT — MIFFLIN-ST. JEOR: SCORE: 1241.43

## 2019-05-09 NOTE — PROGRESS NOTES
Vascular Medicine Progress Note     Ignacio Cooper is a 49 year old female who who is here status post sclerotherapy of the right lower extremity    Interval History   Patient was traveling, she did travel after the sclerotherapy immediately, patient felt pain in the upper thigh left side no swelling pain was intermittent and then she had some pain on the left lower chest lasted for 2 days it was intermittent and it did increase with inspiration few days later she was diagnosed with upper respiratory tract infection with no shortness of breath no chest pain no palpitations, the only chest pain was intermittent as I mentioned was on the left side of the chest  Patient is here today fine with residual upper respiratory tract infection symptoms no leg swelling  The site of sclerotherapy is tender hard indicating superficial thrombophlebitis  Its noteworthy that the patient has factor V Leiden mutation, lupus anticoagulant disorder she is on Coumadin, for recurrent DVTs also she is status post stenting of the iliac vein 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  Patient has been always therapeutic, last INR was 3.4    Physical Exam   Temp: 98.4  F (36.9  C) Temp src: Tympanic BP: 118/66 Pulse: 82            Vitals:    05/09/19 1107   Weight: 62.1 kg (137 lb)     Vital Signs with Ranges  Temp:  [98.4  F (36.9  C)] 98.4  F (36.9  C)  Pulse:  [82] 82  BP: (118)/(66) 118/66  [unfilled]    Constitutional: awake, alert, cooperative, no apparent distress, and appears stated age  Eyes: Lids and lashes normal, pupils equal, round and reactive to light, extra ocular muscles intact, sclera clear, conjunctiva normal  ENT: normocepalic, without obvious abnormality, oropharynx pink and moist  Hematologic / Lymphatic: no lymphadenopathy  Respiratory: No increased work of breathing, good air exchange, clear to auscultation bilaterally, no crackles or wheezing  Cardiovascular: regular rate and rhythm, normal S1 and S2 and no murmur noted  GI:  Normal bowel sounds, soft, non-distended, non-tender  Skin: no redness, warmth, or swelling, no rashes and no lesions  Musculoskeletal: There is no redness, warmth, or swelling of the joints.  Full range of motion noted.  Motor strength is 5 out of 5 all extremities bilaterally.  Tone is normal.  Neurologic: Awake, alert, oriented to name, place and time.  Cranial nerves II-XII are grossly intact.  Motor is 5 out of 5 bilaterally.    Neuropsychiatric:  Normal affect, memory, insight.  Pulses: Palpable pulses  . No carotid bruits appreciated.     Medications         Data   No results found for this or any previous visit (from the past 24 hour(s)).    Assessment & Plan   (D68.51) Factor V Leiden (H)  (primary encounter diagnosis)  Comment: Stable no diagnosis or newly diagnosed blood clots, patient is on Coumadin and she is therapeutic  Plan: We will continue on Coumadin target INR is 2-3    (I83.12) Varicose veins of left lower extremity with inflammation  Comment: Stable, will obtain d-dimer and Doppler venous ultrasound to rule out any new DVT which is highly unlikely in the presence of therapeutic Coumadin levels  Plan: D dimer quantitative, US Lower Extremity Venous        Duplex Left            (I82.409) Recurrent deep vein thrombosis (DVT) (H)  Comment: As above  Plan: D dimer quantitative, US Lower Extremity Venous        Duplex Left                  Jose F Mercedes MD

## 2019-05-09 NOTE — NURSING NOTE
"Initial /66 (BP Location: Right arm, Patient Position: Sitting, Cuff Size: Adult Regular)   Pulse 82   Temp 98.4  F (36.9  C) (Tympanic)   Ht 1.626 m (5' 4\")   Wt 62.1 kg (137 lb)   BMI 23.52 kg/m   Estimated body mass index is 23.52 kg/m  as calculated from the following:    Height as of this encounter: 1.626 m (5' 4\").    Weight as of this encounter: 62.1 kg (137 lb). .    Fidelia Alves MA    "

## 2019-05-14 ENCOUNTER — HOSPITAL ENCOUNTER (OUTPATIENT)
Dept: ULTRASOUND IMAGING | Facility: CLINIC | Age: 49
Discharge: HOME | End: 2019-05-14
Attending: INTERNAL MEDICINE | Admitting: INTERNAL MEDICINE
Payer: COMMERCIAL

## 2019-05-14 DIAGNOSIS — I83.12 VARICOSE VEINS OF LEFT LOWER EXTREMITY WITH INFLAMMATION: ICD-10-CM

## 2019-05-14 DIAGNOSIS — I82.409 RECURRENT DEEP VEIN THROMBOSIS (DVT) (H): ICD-10-CM

## 2019-05-14 PROCEDURE — 93971 EXTREMITY STUDY: CPT | Mod: LT

## 2019-05-20 ENCOUNTER — ANTICOAGULATION THERAPY VISIT (OUTPATIENT)
Dept: ANTICOAGULATION | Facility: CLINIC | Age: 49
End: 2019-05-20
Payer: COMMERCIAL

## 2019-05-20 DIAGNOSIS — D68.62 LUPUS ANTICOAGULANT DISORDER (H): ICD-10-CM

## 2019-05-20 DIAGNOSIS — I82.409 RECURRENT DEEP VEIN THROMBOSIS (DVT) (H): ICD-10-CM

## 2019-05-20 DIAGNOSIS — Z79.01 LONG TERM CURRENT USE OF ANTICOAGULANT THERAPY: ICD-10-CM

## 2019-05-20 DIAGNOSIS — D68.51 FACTOR V LEIDEN (H): ICD-10-CM

## 2019-05-20 LAB — INR PPP: 2.81 (ref 0.86–1.14)

## 2019-05-20 PROCEDURE — 85610 PROTHROMBIN TIME: CPT | Performed by: FAMILY MEDICINE

## 2019-05-20 PROCEDURE — 36415 COLL VENOUS BLD VENIPUNCTURE: CPT | Performed by: FAMILY MEDICINE

## 2019-05-20 PROCEDURE — 99207 ZZC NO CHARGE NURSE ONLY: CPT

## 2019-05-20 NOTE — PROGRESS NOTES
ANTICOAGULATION FOLLOW-UP CLINIC VISIT    Patient Name:  Ignacio Cooper  Date:  2019  Contact Type:  Telephone/ DVM    SUBJECTIVE:  Patient Findings     Comments:   Continue maintenance warfarin dose. Will recheck INR in 4 weeks. Left DVM.          Clinical Outcomes     Negatives:   Major bleeding event, Thromboembolic event, Anticoagulation-related hospital admission, Anticoagulation-related ED visit, Anticoagulation-related fatality    Comments:   Continue maintenance warfarin dose. Will recheck INR in 4 weeks. Left DVM.             OBJECTIVE    INR   Date Value Ref Range Status   2019 2.81 (H) 0.86 - 1.14 Final       ASSESSMENT / PLAN  INR assessment THER    Recheck INR In: 4 WEEKS    INR Location Outside lab      Anticoagulation Summary  As of 2019    INR goal:   2.0-3.0   TTR:   50.4 % (6.3 mo)   INR used for dosin.81 (2019)   Warfarin maintenance plan:   5 mg (5 mg x 1), then 5 mg (5 mg x 1), then 7.5 mg (5 mg x 1.5) repeating every 3 days   Full warfarin instructions:   5 mg, then 5 mg, then 7.5 mg repeating every 3 days   Average weekly warfarin total:   40.833 mg   No change documented:   Alexandria Ramon RN   Plan last modified:   Sadie Olivas RN (2019)   Next INR check:   2019   Priority:   INR   Target end date:   Indefinite    Indications    Lupus anticoagulant disorder (H) [D68.62]  Factor V Leiden (H) [D68.51]  Recurrent deep vein thrombosis (DVT) (H) [I82.409]             Anticoagulation Episode Summary     INR check location:       Preferred lab:       Send INR reminders to:   [facility] POOL    Comments:   *Patient switched to [facility] recently but has been taking warfarin for 19 years per patient. Recently moved to area.      Anticoagulation Care Providers     Provider Role Specialty Phone number    Fidelia Forbes MD [facility] [phone]            See the Encounter Report to view Anticoagulation Flowsheet and Dosing Calendar  (Go to Encounters tab in chart review, and find the Anticoagulation Therapy Visit)        Alexandria Ramon RN

## 2019-06-19 ENCOUNTER — TELEPHONE (OUTPATIENT)
Dept: OTHER | Facility: CLINIC | Age: 49
End: 2019-06-19

## 2019-06-19 NOTE — TELEPHONE ENCOUNTER
I called Ignacio and discussed her US results.  She will call and schedule f/u OV in future if her leg continues to hurt.    Sena PILLAIN, RN

## 2019-06-19 NOTE — TELEPHONE ENCOUNTER
Patient had an Ultrasound done on 05/14/19 and has not received her results. Dr Mercedes ordered her test. Please call Ignacio at [phone] with her test results. It is ok to LDVM on that number.

## 2019-06-24 DIAGNOSIS — Z79.01 LONG TERM CURRENT USE OF ANTICOAGULANT THERAPY: ICD-10-CM

## 2019-06-24 DIAGNOSIS — D68.51 FACTOR V LEIDEN (H): ICD-10-CM

## 2019-06-24 DIAGNOSIS — D68.62 LUPUS ANTICOAGULANT DISORDER (H): ICD-10-CM

## 2019-06-24 DIAGNOSIS — I82.409 RECURRENT DEEP VEIN THROMBOSIS (DVT) (H): ICD-10-CM

## 2019-06-24 LAB — INR PPP: 2.21 (ref 0.86–1.14)

## 2019-06-24 PROCEDURE — 85610 PROTHROMBIN TIME: CPT | Performed by: FAMILY MEDICINE

## 2019-06-24 PROCEDURE — 36415 COLL VENOUS BLD VENIPUNCTURE: CPT | Performed by: FAMILY MEDICINE

## 2019-06-25 ENCOUNTER — ANTICOAGULATION THERAPY VISIT (OUTPATIENT)
Dept: ANTICOAGULATION | Facility: CLINIC | Age: 49
End: 2019-06-25
Payer: COMMERCIAL

## 2019-06-25 DIAGNOSIS — D68.62 LUPUS ANTICOAGULANT DISORDER (H): ICD-10-CM

## 2019-06-25 DIAGNOSIS — D68.51 FACTOR V LEIDEN (H): ICD-10-CM

## 2019-06-25 DIAGNOSIS — I82.409 RECURRENT DEEP VEIN THROMBOSIS (DVT) (H): ICD-10-CM

## 2019-06-25 PROCEDURE — 99207 ZZC NO CHARGE NURSE ONLY: CPT

## 2019-06-25 NOTE — PROGRESS NOTES
ANTICOAGULATION FOLLOW-UP CLINIC VISIT    Patient Name:  Ignacio Cooper  Date:  2019  Contact Type:  Telephone    SUBJECTIVE:  Patient Findings     Comments:   No changes in medications, diet, or activity. No concerns with bleeding or bruising. Took warfarin as prescribed.   Continue maintenance warfarin dose. Will recheck INR in 6 weeks.   Patient verbalizes understanding and agrees with plan. No further questions at this time.           Clinical Outcomes     Negatives:   Major bleeding event, Thromboembolic event, Anticoagulation-related hospital admission, Anticoagulation-related ED visit, Anticoagulation-related fatality    Comments:   No changes in medications, diet, or activity. No concerns with bleeding or bruising. Took warfarin as prescribed.   Continue maintenance warfarin dose. Will recheck INR in 6 weeks.   Patient verbalizes understanding and agrees with plan. No further questions at this time.              OBJECTIVE    INR   Date Value Ref Range Status   2019 2.21 (H) 0.86 - 1.14 Final       ASSESSMENT / PLAN  INR assessment THER    Recheck INR In: 6 WEEKS    INR Location Outside lab      Anticoagulation Summary  As of 2019    INR goal:   2.0-3.0   TTR:   58.2 % (7.5 mo)   INR used for dosin.21 (2019)   Warfarin maintenance plan:   5 mg (5 mg x 1), then 5 mg (5 mg x 1), then 7.5 mg (5 mg x 1.5) repeating every 3 days   Full warfarin instructions:   5 mg, then 5 mg, then 7.5 mg repeating every 3 days   Average weekly warfarin total:   40.833 mg   No change documented:   Alexandria Ramon RN   Plan last modified:   Sadie Olivas RN (2019)   Next INR check:   2019   Priority:   INR   Target end date:   Indefinite    Indications    Lupus anticoagulant disorder (H) [D68.62]  Factor V Leiden (H) [D68.51]  Recurrent deep vein thrombosis (DVT) (H) [I82.409]             Anticoagulation Episode Summary     INR check location:       Preferred lab:       Send INR reminders  to:   [place] [facility] POOL    Comments:   *Patient switched to [facility] recently but has been taking warfarin for 19 years per patient. Recently moved to area.      Anticoagulation Care Providers     Provider Role Specialty Phone number    Fidelia Forbes MD [facility] [phone]            See the Encounter Report to view Anticoagulation Flowsheet and Dosing Calendar (Go to Encounters tab in chart review, and find the Anticoagulation Therapy Visit)        Alexandria Ramon RN

## 2019-08-07 DIAGNOSIS — D68.51 FACTOR V LEIDEN (H): ICD-10-CM

## 2019-08-07 DIAGNOSIS — D68.62 LUPUS ANTICOAGULANT DISORDER (H): ICD-10-CM

## 2019-08-07 DIAGNOSIS — Z79.01 LONG TERM CURRENT USE OF ANTICOAGULANT THERAPY: ICD-10-CM

## 2019-08-07 DIAGNOSIS — I82.409 RECURRENT DEEP VEIN THROMBOSIS (DVT) (H): ICD-10-CM

## 2019-08-07 LAB — INR PPP: 2.86 (ref 0.86–1.14)

## 2019-08-07 PROCEDURE — 85610 PROTHROMBIN TIME: CPT | Performed by: FAMILY MEDICINE

## 2019-08-07 PROCEDURE — 36415 COLL VENOUS BLD VENIPUNCTURE: CPT | Performed by: FAMILY MEDICINE

## 2019-08-08 ENCOUNTER — ANTICOAGULATION THERAPY VISIT (OUTPATIENT)
Dept: ANTICOAGULATION | Facility: CLINIC | Age: 49
End: 2019-08-08
Payer: COMMERCIAL

## 2019-08-08 DIAGNOSIS — I82.409 RECURRENT DEEP VEIN THROMBOSIS (DVT) (H): ICD-10-CM

## 2019-08-08 DIAGNOSIS — D68.51 FACTOR V LEIDEN (H): ICD-10-CM

## 2019-08-08 DIAGNOSIS — D68.62 LUPUS ANTICOAGULANT DISORDER (H): ICD-10-CM

## 2019-08-08 PROCEDURE — 99207 ZZC NO CHARGE NURSE ONLY: CPT

## 2019-08-08 NOTE — PROGRESS NOTES
ADDENDUM: Patient called [facility] requesting a face to face visit; she states she usually does lab draws. She requests [facility] as this is close to her home. Writer scheduled her for 19 at 11:45 AM.     Jeimy Ramos RN 19 at 11:38 AM    ANTICOAGULATION FOLLOW-UP CLINIC VISIT    Patient Name:  Ignacio Cooper  Date:  2019  Contact Type:  Telephone/ left detailed voicemail (patient doesn't read [employer] messages)    SUBJECTIVE:  Patient Findings     Comments:   Left message for patient with results and dosing recommendations. Requested patient to call [facility] back ([phone]) to report any missed doses, falls, signs/symptoms of bleeding or clotting, any changes in health, medications, or diet. Asked patient to call back with any questions or concerns.     No changes in EPIC noted.          Clinical Outcomes     Comments:   Left message for patient with results and dosing recommendations. Requested patient to call [facility] back ([phone]) to report any missed doses, falls, signs/symptoms of bleeding or clotting, any changes in health, medications, or diet. Asked patient to call back with any questions or concerns.     No changes in EPIC noted.             OBJECTIVE    INR   Date Value Ref Range Status   2019 2.86 (H) 0.86 - 1.14 Final       ASSESSMENT / PLAN  No question data found.  Anticoagulation Summary  As of 2019    INR goal:   2.0-3.0   TTR:   65.0 % (8.9 mo)   INR used for dosin.86 (2019)   Warfarin maintenance plan:   5 mg (5 mg x 1), then 5 mg (5 mg x 1), then 7.5 mg (5 mg x 1.5) repeating every 3 days   Full warfarin instructions:   5 mg, then 5 mg, then 7.5 mg repeating every 3 days   Average weekly warfarin total:   40.833 mg   No change documented:   Koki Ascencio RN   Plan last modified:   Sadie Olivas RN (2019)   Next INR check:   2019   Priority:   INR   Target end date:   Indefinite    Indications    Lupus anticoagulant disorder (H)  [D68.62]  Factor V Leiden (H) [D68.51]  Recurrent deep vein thrombosis (DVT) (H) [I82.409]             Anticoagulation Episode Summary     INR check location:       Preferred lab:       Send INR reminders to:   [place] PHONE [employer] POOL    Comments:   *Patient switched to [facility] recently but has been taking warfarin for 19 years per patient. Recently moved to area.      Anticoagulation Care Providers     Provider Role Specialty Phone number    Fidelia Forbes MD [facility] [phone]            See the Encounter Report to view Anticoagulation Flowsheet and Dosing Calendar (Go to Encounters tab in chart review, and find the Anticoagulation Therapy Visit)        Koki Ascencio RN CACP

## 2019-09-20 ENCOUNTER — ANTICOAGULATION THERAPY VISIT (OUTPATIENT)
Dept: ANTICOAGULATION | Facility: CLINIC | Age: 49
End: 2019-09-20
Payer: COMMERCIAL

## 2019-09-20 DIAGNOSIS — Z79.01 LONG TERM CURRENT USE OF ANTICOAGULANT THERAPY: ICD-10-CM

## 2019-09-20 DIAGNOSIS — D68.51 FACTOR V LEIDEN (H): ICD-10-CM

## 2019-09-20 DIAGNOSIS — D68.62 LUPUS ANTICOAGULANT DISORDER (H): ICD-10-CM

## 2019-09-20 DIAGNOSIS — I82.409 RECURRENT DEEP VEIN THROMBOSIS (DVT) (H): ICD-10-CM

## 2019-09-20 LAB — INR POINT OF CARE: 1.9 (ref 0.86–1.14)

## 2019-09-20 PROCEDURE — 85610 PROTHROMBIN TIME: CPT | Mod: QW

## 2019-09-20 PROCEDURE — 99207 ZZC NO CHARGE NURSE ONLY: CPT

## 2019-09-20 PROCEDURE — 36416 COLLJ CAPILLARY BLOOD SPEC: CPT

## 2019-09-20 RX ORDER — WARFARIN SODIUM 5 MG/1
TABLET ORAL
Qty: 105 TABLET | Refills: 0 | Status: SHIPPED | OUTPATIENT
Start: 2019-09-20 | End: 2019-11-05 | Stop reason: DRUGHIGH

## 2019-09-20 NOTE — PROGRESS NOTES
ANTICOAGULATION FOLLOW-UP CLINIC VISIT    Patient Name:  Ignacio Cooper  Date:  2019  Contact Type:  Face to Face    SUBJECTIVE:  Patient Findings     Positives:   Change in medications (started an iron supplement a few weeks ago)    Comments:   Patient reports she has taken her warfarin as directed, no missed/extra doses. Patient does report starting an iron supplement, and she did have a large spinach salad 2 days ago. Patient will continue maintenance dose, recheck INR in 2 weeks. Patient requested a refill today, states last time her medication was filled she received Jantoven and she prefers the Warfarin, stating the Jantoven is harder to split. Writer offered patient a pill splitter, she declined.        Clinical Outcomes     Comments:   Patient reports she has taken her warfarin as directed, no missed/extra doses. Patient does report starting an iron supplement, and she did have a large spinach salad 2 days ago. Patient will continue maintenance dose, recheck INR in 2 weeks. Patient requested a refill today, states last time her medication was filled she received Jantoven and she prefers the Warfarin, stating the Jantoven is harder to split. Writer offered patient a pill splitter, she declined.           OBJECTIVE    INR Protime   Date Value Ref Range Status   2019 1.9 (A) 0.86 - 1.14 Final       ASSESSMENT / PLAN  INR assessment SUB    Recheck INR In: 2 WEEKS    INR Location Clinic      Anticoagulation Summary  As of 2019    INR goal:   2.0-3.0   TTR:   68.5 % (10.4 mo)   INR used for dosin.9! (2019)   Warfarin maintenance plan:   5 mg (5 mg x 1), then 5 mg (5 mg x 1), then 7.5 mg (5 mg x 1.5) repeating every 3 days   Full warfarin instructions:   5 mg, then 5 mg, then 7.5 mg repeating every 3 days   Average weekly warfarin total:   40.833 mg   No change documented:   Jeimy Ramos RN   Plan last modified:   Sadie Olivas RN (2019)   Next INR check:   10/4/2019    Priority:   INR   Target end date:   Indefinite    Indications    Lupus anticoagulant disorder (H) [D68.62]  Factor V Leiden (H) [D68.51]  Recurrent deep vein thrombosis (DVT) (H) [I82.409]             Anticoagulation Episode Summary     INR check location:       Preferred lab:       Send INR reminders to:   [facility]    Comments:   *Patient switched to [facility] recently but has been taking warfarin for 19 years per patient. Recently moved to area.      Anticoagulation Care Providers     Provider Role Specialty Phone number    Fidelia Forbes MD [facility] [phone]            See the Encounter Report to view Anticoagulation Flowsheet and Dosing Calendar (Go to Encounters tab in chart review, and find the Anticoagulation Therapy Visit)      Jeimy Ramos RN

## 2019-09-30 ENCOUNTER — HEALTH MAINTENANCE LETTER (OUTPATIENT)
Age: 49
End: 2019-09-30

## 2019-10-04 ENCOUNTER — ANTICOAGULATION THERAPY VISIT (OUTPATIENT)
Dept: ANTICOAGULATION | Facility: CLINIC | Age: 49
End: 2019-10-04
Payer: COMMERCIAL

## 2019-10-04 ENCOUNTER — TELEPHONE (OUTPATIENT)
Dept: ANTICOAGULATION | Facility: CLINIC | Age: 49
End: 2019-10-04

## 2019-10-04 DIAGNOSIS — D68.62 LUPUS ANTICOAGULANT DISORDER (H): ICD-10-CM

## 2019-10-04 DIAGNOSIS — D68.51 FACTOR V LEIDEN (H): ICD-10-CM

## 2019-10-04 DIAGNOSIS — Z79.01 LONG TERM CURRENT USE OF ANTICOAGULANT THERAPY: ICD-10-CM

## 2019-10-04 DIAGNOSIS — I82.409 RECURRENT DEEP VEIN THROMBOSIS (DVT) (H): ICD-10-CM

## 2019-10-04 DIAGNOSIS — D68.51 FACTOR V LEIDEN (H): Primary | ICD-10-CM

## 2019-10-04 LAB — INR POINT OF CARE: 2.5 (ref 0.86–1.14)

## 2019-10-04 PROCEDURE — 85610 PROTHROMBIN TIME: CPT | Mod: QW

## 2019-10-04 PROCEDURE — 99207 ZZC NO CHARGE NURSE ONLY: CPT

## 2019-10-04 PROCEDURE — 36416 COLLJ CAPILLARY BLOOD SPEC: CPT

## 2019-10-04 NOTE — TELEPHONE ENCOUNTER
Ignacio is scheduled to have bilateral knee arthroscopy completed on Date is not yet scheduled-she states she is awaiting insurance approval and will need to hold warfarin for 5 days. (Per patient-she was told to hold 5-7 days).   Patient has a pre-op scheduled 10/8/19 with Tamela Fontaine CNP.   Patient is currently on warfarin for Lupus anticoagulant disorder, Factor V Leiden, recurrent DVT.   Perioperative Thrombotic Risk Stratification   High risk for clot    (Bridge) Medium risk for clot   (Maybe Bridge) Low risk for clot   (No Bridge)     Artificial Mitral valve    Artificial aortic valve if tilting disc or caged ball    Stroke, TIA within last 6 months    VTE within last 3 months    Severe thrombophilia (protein C or S deficiency, antithrombin, homozygous Factor V Leiden, antiphospholipid antibodies)  AFIB and    [id number] score 7-9    Stroke/TIA within last 3 months    Rheumatic valvular heart disease   Bileaflet aortic valve  Plus  AFib, prior stroke or TIA, HTN, DM, CHF, or over 75 years old    [id number] score 5-6    VTE within past 3 to 12 months    Non-severe thrombophilia (heterozygous factor V Leiden)    Recurrent VTE    Active cancer (or treated within last 6 months)     Bileaflet valve without Afib, no other risk factors    [id number] < 4 (with no history stroke or TIA)    VTE more than 12 months ago     While the patient is off warfarin, would you recommend the patient use enoxaparin injections as a bridge? If yes, would you like the prophylactic dose (40mg daily) or the therapeutic dose (1mg/kg twice daily)? Should the patient use enoxaparin both before and after the procedure or only afterwards?  Wt Readings from Last 2 Encounters:   05/09/19 62.1 kg (137 lb)   05/06/19 62.1 kg (137 lb)     CrCl cannot be calculated (No successful lab value found.).   Therapeutic Enoxaparin if high risk for clot   CrCl >30ml/min, 1mg/kg/twice daily OR 1.5mg/kg/daily   CrCl >15ml/min, 1mg/kg/daily   CrCl  <15mg/min or on dialysis use heparin  Prophylactic if need protection after procedure (i.e. high clot risk procedure)   Anticoagulation Clinic Staff  Phone: [phone]   Fax: [phone]    [place] # [id number]

## 2019-10-04 NOTE — PROGRESS NOTES
ANTICOAGULATION FOLLOW-UP CLINIC VISIT    Patient Name:  Ignacio Cooper  Date:  10/4/2019  Contact Type:  Face to Face    SUBJECTIVE:  Patient Findings     Positives:   Upcoming invasive procedure (bilateral knee arthoscopy)    Comments:   Patient reports no changes in medication, activity, or diet. Patient reports no changes in health. Patient reports has taken warfarin as instructed. Patient reports no increased bruising or bleeding and no signs or symptoms of a blood clot. Patient reports she will be having bilateral knee arthroscopy done and states she was told her hold her warfarin 5-7 days (she does not have the date scheduled yet or a pre-op scheduled--she is waiting for insurance to approve the procedure). She reports she has bridged in the past with Lovenox. Writer will send a telephone encounter to her PCP regarding this. She will also stop at the  to schedule a pre-op appointment. She states she is hoping to get all of this done in the next few weeks. Next INR appt in 6 weeks or sooner, pending surgery date. Patient to notify [facility] when she schedules her procedure.         Clinical Outcomes     Comments:   Patient reports no changes in medication, activity, or diet. Patient reports no changes in health. Patient reports has taken warfarin as instructed. Patient reports no increased bruising or bleeding and no signs or symptoms of a blood clot. Patient reports she will be having bilateral knee arthroscopy done and states she was told her hold her warfarin 5-7 days (she does not have the date scheduled yet or a pre-op scheduled--she is waiting for insurance to approve the procedure). She reports she has bridged in the past with Lovenox. Writer will send a telephone encounter to her PCP regarding this. She will also stop at the  to schedule a pre-op appointment. She states she is hoping to get all of this done in the next few weeks. Next INR appt in 6 weeks or sooner, pending surgery date.  Patient to notify [facility] when she schedules her procedure.            OBJECTIVE    INR Protime   Date Value Ref Range Status   10/04/2019 2.5 (A) 0.86 - 1.14 Final       ASSESSMENT / PLAN  INR assessment THER    Recheck INR In: 6 WEEKS    INR Location Clinic      Anticoagulation Summary  As of 10/4/2019    INR goal:   2.0-3.0   TTR:   69.1 % (10.9 mo)   INR used for dosin.5 (10/4/2019)   Warfarin maintenance plan:   5 mg (5 mg x 1), then 5 mg (5 mg x 1), then 7.5 mg (5 mg x 1.5) repeating every 3 days   Full warfarin instructions:   5 mg, then 5 mg, then 7.5 mg repeating every 3 days   Average weekly warfarin total:   40.833 mg   No change documented:   Jeimy Ramos RN   Plan last modified:   Sadie Olivas RN (2019)   Next INR check:   11/15/2019   Priority:   INR   Target end date:   Indefinite    Indications    Lupus anticoagulant disorder (H) [D68.62]  Factor V Leiden (H) [D68.51]  Recurrent deep vein thrombosis (DVT) (H) [I82.409]             Anticoagulation Episode Summary     INR check location:       Preferred lab:       Send INR reminders to:   [facility]    Comments:   *Patient switched to [facility] recently but has been taking warfarin for 19 years per patient. Recently moved to area.      Anticoagulation Care Providers     Provider Role Specialty Phone number    Fidelia Forbes MD [facility] [phone]            See the Encounter Report to view Anticoagulation Flowsheet and Dosing Calendar (Go to Encounters tab in chart review, and find the Anticoagulation Therapy Visit)      Jeimy Ramos, MARLEE

## 2019-10-07 NOTE — TELEPHONE ENCOUNTER
Writer spoke with patient, she was already aware she would likely need Lovenox. Patient is however due to have a creatinine drawn to ensure the therapeutic Lovenox dose is appropriate for her to take given kidney function. She will stop at the lab before/after her appointment tomorrow to have her creatinine drawn prior to the Lovenox being ordered.    Patient has not yet scheduled the arthroscopy as her insurance is still pending approval.

## 2019-10-08 ENCOUNTER — ANTICOAGULATION THERAPY VISIT (OUTPATIENT)
Dept: ANTICOAGULATION | Facility: CLINIC | Age: 49
End: 2019-10-08

## 2019-10-08 ENCOUNTER — OFFICE VISIT (OUTPATIENT)
Dept: FAMILY MEDICINE | Facility: CLINIC | Age: 49
End: 2019-10-08
Payer: COMMERCIAL

## 2019-10-08 VITALS
SYSTOLIC BLOOD PRESSURE: 106 MMHG | BODY MASS INDEX: 23.29 KG/M2 | WEIGHT: 136.4 LBS | RESPIRATION RATE: 16 BRPM | TEMPERATURE: 98.2 F | HEIGHT: 64 IN | OXYGEN SATURATION: 98 % | DIASTOLIC BLOOD PRESSURE: 60 MMHG | HEART RATE: 68 BPM

## 2019-10-08 DIAGNOSIS — I82.409 RECURRENT DEEP VEIN THROMBOSIS (DVT) (H): ICD-10-CM

## 2019-10-08 DIAGNOSIS — M25.561 ACUTE PAIN OF BOTH KNEES: ICD-10-CM

## 2019-10-08 DIAGNOSIS — D68.62 LUPUS ANTICOAGULANT DISORDER (H): ICD-10-CM

## 2019-10-08 DIAGNOSIS — J45.30 MILD PERSISTENT ASTHMA, UNSPECIFIED WHETHER COMPLICATED: ICD-10-CM

## 2019-10-08 DIAGNOSIS — Z79.01 LONG TERM CURRENT USE OF ANTICOAGULANT THERAPY: ICD-10-CM

## 2019-10-08 DIAGNOSIS — M25.562 ACUTE PAIN OF BOTH KNEES: ICD-10-CM

## 2019-10-08 DIAGNOSIS — Z01.818 PREOP GENERAL PHYSICAL EXAM: Primary | ICD-10-CM

## 2019-10-08 DIAGNOSIS — D68.51 FACTOR V LEIDEN (H): ICD-10-CM

## 2019-10-08 LAB
ANION GAP SERPL CALCULATED.3IONS-SCNC: 5 MMOL/L (ref 3–14)
BUN SERPL-MCNC: 8 MG/DL (ref 7–30)
CALCIUM SERPL-MCNC: 9.2 MG/DL (ref 8.5–10.1)
CHLORIDE SERPL-SCNC: 107 MMOL/L (ref 94–109)
CO2 SERPL-SCNC: 27 MMOL/L (ref 20–32)
CREAT SERPL-MCNC: 0.75 MG/DL (ref 0.52–1.04)
ERYTHROCYTE [DISTWIDTH] IN BLOOD BY AUTOMATED COUNT: 19.8 % (ref 10–15)
GFR SERPL CREATININE-BSD FRML MDRD: >90 ML/MIN/{1.73_M2}
GLUCOSE SERPL-MCNC: 72 MG/DL (ref 70–99)
HCT VFR BLD AUTO: 37.5 % (ref 35–47)
HGB BLD-MCNC: 12.3 G/DL (ref 11.7–15.7)
MCH RBC QN AUTO: 27.5 PG (ref 26.5–33)
MCHC RBC AUTO-ENTMCNC: 32.8 G/DL (ref 31.5–36.5)
MCV RBC AUTO: 84 FL (ref 78–100)
PLATELET # BLD AUTO: 261 10E9/L (ref 150–450)
POTASSIUM SERPL-SCNC: 4.1 MMOL/L (ref 3.4–5.3)
RBC # BLD AUTO: 4.47 10E12/L (ref 3.8–5.2)
SODIUM SERPL-SCNC: 139 MMOL/L (ref 133–144)
WBC # BLD AUTO: 6.5 10E9/L (ref 4–11)

## 2019-10-08 PROCEDURE — 99214 OFFICE O/P EST MOD 30 MIN: CPT | Performed by: NURSE PRACTITIONER

## 2019-10-08 PROCEDURE — 85027 COMPLETE CBC AUTOMATED: CPT | Performed by: NURSE PRACTITIONER

## 2019-10-08 PROCEDURE — 36415 COLL VENOUS BLD VENIPUNCTURE: CPT | Performed by: NURSE PRACTITIONER

## 2019-10-08 PROCEDURE — 80048 BASIC METABOLIC PNL TOTAL CA: CPT | Performed by: NURSE PRACTITIONER

## 2019-10-08 RX ORDER — FERROUS SULFATE 325(65) MG
325 TABLET ORAL
COMMUNITY
Start: 2019-09-05

## 2019-10-08 ASSESSMENT — ASTHMA QUESTIONNAIRES
ACUTE_EXACERBATION_TODAY: NO
QUESTION_2 LAST FOUR WEEKS HOW OFTEN HAVE YOU HAD SHORTNESS OF BREATH: NOT AT ALL
QUESTION_5 LAST FOUR WEEKS HOW WOULD YOU RATE YOUR ASTHMA CONTROL: COMPLETELY CONTROLLED
QUESTION_1 LAST FOUR WEEKS HOW MUCH OF THE TIME DID YOUR ASTHMA KEEP YOU FROM GETTING AS MUCH DONE AT WORK, SCHOOL OR AT HOME: NONE OF THE TIME
QUESTION_3 LAST FOUR WEEKS HOW OFTEN DID YOUR ASTHMA SYMPTOMS (WHEEZING, COUGHING, SHORTNESS OF BREATH, CHEST TIGHTNESS OR PAIN) WAKE YOU UP AT NIGHT OR EARLIER THAN USUAL IN THE MORNING: NOT AT ALL
QUESTION_4 LAST FOUR WEEKS HOW OFTEN HAVE YOU USED YOUR RESCUE INHALER OR NEBULIZER MEDICATION (SUCH AS ALBUTEROL): NOT AT ALL
ACT_TOTALSCORE: 25

## 2019-10-08 ASSESSMENT — MIFFLIN-ST. JEOR: SCORE: 1238.71

## 2019-10-08 NOTE — PROGRESS NOTES
[facility]  [address]  [facility] [address]  [phone]  Dept: [phone]    PRE-OP EVALUATION:  Today's date: 10/8/2019    Ignacio Cooper (: 1970) presents for pre-operative evaluation assessment as requested by Dr. Olivia.  She requires evaluation and anesthesia risk assessment prior to undergoing surgery/procedure for treatment of bone spurs in bilateral knees .    Proposed Surgery/ Procedure: arthroscopic knee surgery, bilateral   Date of Surgery/ Procedure: 10/23/19  Time of Surgery/ Procedure: [facility]  Hospital/Surgical Facility: [facility]   Fax number for surgical facility: [phone]  Primary Physician: [facility]  Type of Anesthesia Anticipated: General    Patient has a Health Care Directive or Living Will:  NO    1. YES - DO YOU HAVE A HISTORY OF HEART ATTACK, STROKE, STENT, BYPASS OR SURGERY ON AN ARTERY IN THE HEAD, NECK, HEART OR LEG? Left iliac vein  2. NO - Do you ever have any pain or discomfort in your chest?  3. NO - Do you have a history of  Heart Failure?  4. NO - Are you troubled by shortness of breath when: walking on the level, up a slight hill or at night?  5. NO - Do you currently have a cold, bronchitis or other respiratory infection?  6. NO - Do you have a cough, shortness of breath or wheezing?  7. NO - Do you sometimes get pains in the calves of your legs when you walk?  8. YES - DO YOU OR ANYONE IN YOUR FAMILY HAVE PREVIOUS HISTORY OF BLOOD CLOTS? Personal hx-4 DVTs and on warfarin   9. NO - Do you or does anyone in your family have a serious bleeding problem such as prolonged bleeding following surgeries or cuts?  10. YES - HAVE YOU EVER HAD PROBLEMS WITH ANEMIA OR BEEN TOLD TO TAKE IRON PILLS? ON iron tabs for new mild anemia with onset of heavy periods.  11. NO - Have you had any abnormal blood loss such as black, tarry or bloody stools, or abnormal vaginal bleeding?  12. NO - Have you ever had a  blood transfusion?  13. YES - HAVE YOU OR ANY OF YOUR RELATIVES EVER HAD PROBLEMS WITH ANESTHESIA? Nausea   14. NO - Do you have sleep apnea, excessive snoring or daytime drowsiness?  15. NO - Do you have any prosthetic heart valves?  16. NO - Do you have prosthetic joints?  17. NO - Is there any chance that you may be pregnant?      HPI:     HPI related to upcoming procedure: Bilateral knee pain and bone spurs,       ASTHMA - Patient has a longstanding history of moderate-severe Asthma . Patient has been doing well overall noting NO SYMPTOMS and continues on medication regimen consisting of PRN Breo without adverse reactions or side effects.       MEDICAL HISTORY:     Patient Active Problem List    Diagnosis Date Noted     Mild persistent asthma, unspecified whether complicated 11/05/2018     Priority: Medium     Factor V Leiden (H) 11/01/2018     Priority: Medium     Lupus anticoagulant disorder (H) 11/01/2018     Priority: Medium     Recurrent deep vein thrombosis (DVT) (H) 11/01/2018     Priority: Medium     Long term current use of anticoagulant therapy 11/01/2018     Priority: Medium      History reviewed. No pertinent past medical history.  Past Surgical History:   Procedure Laterality Date     C SC LIGATION, DIVISION AND STRIPPING OF VEIN, LOWER EXN       iliac vein stent       OPEN REDUCTION INTERNAL FIXATION WRIST       Current Outpatient Medications   Medication Sig Dispense Refill     ferrous sulfate (SM IRON) 325 (65 Fe) MG tablet Take 325 mg by mouth       warfarin ANTICOAGULANT (COUMADIN) 5 MG tablet Take 5 mg, then 5 mg, then 7.5 mg repeating every 3 days or as directed by the Anticoagulation Clinic 105 tablet 0     fluticasone-vilanterol (BREO ELLIPTA) 200-25 MCG/INH inhaler Inhale 1 puff into the lungs daily (Patient not taking: Reported on 10/8/2019) 1 Inhaler 11     OTC products: no recent use of OTC ASA, NSAIDS or Steroids    Allergies   Allergen Reactions     Codeine       Latex Allergy:  "NO    Social History     Tobacco Use     Smoking status: Never Smoker     Smokeless tobacco: Never Used   Substance Use Topics     Alcohol use: No     History   Drug Use No       REVIEW OF SYSTEMS:   CONSTITUTIONAL: NEGATIVE for fever, chills, change in weight  INTEGUMENTARY/SKIN: NEGATIVE for worrisome rashes, moles or lesions  EYES: NEGATIVE for vision changes or irritation  ENT/MOUTH: NEGATIVE for ear, mouth and throat problems  RESP: NEGATIVE for significant cough or SOB  BREAST: NEGATIVE for masses, tenderness or discharge  CV: NEGATIVE for chest pain, palpitations or peripheral edema  GI: NEGATIVE for nausea, abdominal pain, heartburn, or change in bowel habits  : NEGATIVE for frequency, dysuria, or hematuria  MUSCULOSKELETAL:POSITIVE  for bilateral knee pain  NEURO: NEGATIVE for weakness, dizziness or paresthesias  ENDOCRINE: NEGATIVE for temperature intolerance, skin/hair changes  HEME: POSITIVE for hx of Factor V Leiden and recurrent DVT  PSYCHIATRIC: NEGATIVE for changes in mood or affect    EXAM:   /60 (BP Location: Right arm, Patient Position: Sitting, Cuff Size: Adult Regular)   Pulse 68   Temp 98.2  F (36.8  C) (Tympanic)   Resp 16   Ht 1.626 m (5' 4\")   Wt 61.9 kg (136 lb 6.4 oz)   SpO2 98%   BMI 23.41 kg/m      GENERAL APPEARANCE: healthy, alert and no distress     EYES: EOMI, PERRL     HENT: ear canals and TM's normal and nose and mouth without ulcers or lesions     NECK: no adenopathy, no asymmetry, masses, or scars and thyroid normal to palpation     RESP: lungs clear to auscultation - no rales, rhonchi or wheezes     CV: regular rates and rhythm, normal S1 S2, no S3 or S4 and no murmur, click or rub     ABDOMEN:  soft, nontender, no HSM or masses and bowel sounds normal     MS: extremities normal- no gross deformities noted, no evidence of inflammation in joints, FROM in all extremities.     SKIN: no suspicious lesions or rashes     NEURO: Normal strength and tone, sensory exam " grossly normal, mentation intact and speech normal     PSYCH: mentation appears normal. and affect normal/bright     LYMPHATICS: No cervical adenopathy    DIAGNOSTICS:     EKG: Not indicated due to non-vascular surgery and low risk of event (age <65 and without cardiac risk factors)  Labs Resulted Today:   Results for orders placed or performed in visit on 10/08/19   CBC with platelets   Result Value Ref Range    WBC 6.5 4.0 - 11.0 10e9/L    RBC Count 4.47 3.8 - 5.2 10e12/L    Hemoglobin 12.3 11.7 - 15.7 g/dL    Hematocrit 37.5 35.0 - 47.0 %    MCV 84 78 - 100 fl    MCH 27.5 26.5 - 33.0 pg    MCHC 32.8 31.5 - 36.5 g/dL    RDW 19.8 (H) 10.0 - 15.0 %    Platelet Count 261 150 - 450 10e9/L   Basic metabolic panel  (Ca, Cl, CO2, Creat, Gluc, K, Na, BUN)   Result Value Ref Range    Sodium 139 133 - 144 mmol/L    Potassium 4.1 3.4 - 5.3 mmol/L    Chloride 107 94 - 109 mmol/L    Carbon Dioxide 27 20 - 32 mmol/L    Anion Gap 5 3 - 14 mmol/L    Glucose 72 70 - 99 mg/dL    Urea Nitrogen 8 7 - 30 mg/dL    Creatinine 0.75 0.52 - 1.04 mg/dL    GFR Estimate >90 >60 mL/min/[1.73_m2]    GFR Estimate If Black >90 >60 mL/min/[1.73_m2]    Calcium 9.2 8.5 - 10.1 mg/dL       Recent Labs   Lab Test 10/04/19 09/20/19   INR 2.5* 1.9*        IMPRESSION:   Reason for surgery/procedure: Persistent bilateral knee pain despite conservative measures, known bone spurs.   Diagnosis/reason for consult: The surgeon is requesting consultation regarding anesthesia and surgical risk for this patient with respect to current and past medical conditions.      The proposed surgical procedure is considered INTERMEDIATE risk.    REVISED CARDIAC RISK INDEX  The patient has the following serious cardiovascular risks for perioperative complications such as (MI, PE, VFib and 3  AV Block):  No serious cardiac risks  INTERPRETATION: 0 risks: Class I (very low risk - 0.4% complication rate)    The patient has the following additional risks for perioperative  complications:  No identified additional risks      ICD-10-CM    1. Preop general physical exam Z01.818 CBC with platelets     Basic metabolic panel  (Ca, Cl, CO2, Creat, Gluc, K, Na, BUN)   2. Acute pain of both knees M25.561     M25.562    3. Recurrent deep vein thrombosis (DVT) (H) I82.409    4. Factor V Leiden (H) D68.51    5. Mild persistent asthma, unspecified whether complicated J45.30    6. Long term current use of anticoagulant therapy Z79.01        RECOMMENDATIONS:     --Because of DVT or PE history, patient should be on low molecular weight heparin and wear compression hose before & after surgery    --Patient is to take all scheduled medications on the day of surgery EXCEPT for modifications listed below.    Anticoagulant or Antiplatelet Medication Use  WARFARIN: Thromboembolic risk is moderate to high (hx of clotting disorder and recurrent DVT), hold warfarin 5 days prior to procedure and start Lovenox/ Heparin 3 days before procedure, last dose 24 hours before procedure .  Bridging therapy will be coordinated by Anticoagulation clinic.        APPROVAL GIVEN to proceed with proposed procedure, without further diagnostic evaluation       Signed Electronically by: CHINTAN Moore CNP    Copy of this evaluation report is provided to requesting physician.    [facility] Preop Guidelines    Revised Cardiac Risk Index

## 2019-10-08 NOTE — RESULT ENCOUNTER NOTE
Anselmo Pierre    Your lab results came back within normal limits. Please let us know if you have any questions.     Take care,    CHINTAN Muñoz CNP

## 2019-10-08 NOTE — PROGRESS NOTES
Wt Readings from Last 2 Encounters:   10/08/19 61.9 kg (136 lb 6.4 oz)   05/09/19 62.1 kg (137 lb)     Lovenox BID 60 mg syringes.    Last warfarin dose: Thursday, October 17th Friday, October 18th, NO warfarin  Saturday, October 19th, NO warfarin  Sunday, October 20th, NO warfarin, begin enoxaparin injections into the abdomen every 12 hours (AM and PM)  Monday, October 21st, NO warfarin, enoxaparin injection into the abdomen every 12 hours (AM and PM)  Tuesday, October 22nd, NO warfarin, enoxaparin injection into the abdomen AM only (no enoxaparin 24 hours prior to surgery)  Wednesday, October 23rd, DAY OF PROCEDURE, NO enoxaparin. Restart warfarin 10mg (2 tabs) in the evening, unless instructed otherwise by the physician.  Thursday, October 24th, Restart enoxaparin injections into the abdomen every 12 hours (AM and PM), unless instructed otherwise by the physician, and 10mg (2 tabs) warfarin in the evening.   Friday, October 25th, Enoxaparin injection into the abdomen every 12 hours (AM and PM) and 7.5mg (1.5 tabs) warfarin in the evening.  Saturday, October 26th, Enoxaparin injection into the abdomen every 12 hours (AM and PM) and 5mg (1 tabs) warfarin in the evening.  Sunday, October 27th, Enoxaparin injection into the abdomen every 12 hours (AM and PM) and 5mg (1 tabs) warfarin in the evening.  Monday, October 28th, Enoxaparin injection into the abdomen in the AM. Recheck INR at the [facility] at 9:15 for further dosing instructions.   If you have any questions please call the Anticoagulation Clinic at [phone].  To schedule your appointment please call [phone].

## 2019-10-08 NOTE — PATIENT INSTRUCTIONS
Last warfarin dose: Thursday, October 17th Friday, October 18th, NO warfarin    Saturday, October 19th, NO warfarin    Sunday, October 20th, NO warfarin, begin enoxaparin injections into the abdomen every 12 hours (AM and PM)    Monday, October 21st, NO warfarin, enoxaparin injection into the abdomen every 12 hours (AM and PM)    Tuesday, October 22nd, NO warfarin, enoxaparin injection into the abdomen AM only (no enoxaparin 24 hours prior to surgery)    Wednesday, October 23rd, DAY OF PROCEDURE, NO enoxaparin. Restart warfarin 10mg (2 tabs) in the evening, unless instructed otherwise by the physician.    Thursday, October 24th, Restart enoxaparin injections into the abdomen every 12 hours (AM and PM), unless instructed otherwise by the physician, and 10mg (2 tabs) warfarin in the evening.     Friday, October 25th, Enoxaparin injection into the abdomen every 12 hours (AM and PM) and 7.5mg (1.5 tabs) warfarin in the evening.    Saturday, October 26th, Enoxaparin injection into the abdomen every 12 hours (AM and PM) and 5mg (1 tabs) warfarin in the evening.    Sunday, October 27th, Enoxaparin injection into the abdomen every 12 hours (AM and PM) and 5mg (1 tabs) warfarin in the evening.    Monday, October 28th, Enoxaparin injection into the abdomen in the AM. Recheck INR at the [facility] at 9:15 for further dosing instructions.     If you have any questions please call the Anticoagulation Clinic at [phone].    To schedule your appointment please call [phone].

## 2019-10-09 DIAGNOSIS — I82.409 DVT (DEEP VENOUS THROMBOSIS) (H): Primary | ICD-10-CM

## 2019-10-09 ASSESSMENT — ASTHMA QUESTIONNAIRES: ACT_TOTALSCORE: 25

## 2019-10-28 ENCOUNTER — ANTICOAGULATION THERAPY VISIT (OUTPATIENT)
Dept: ANTICOAGULATION | Facility: CLINIC | Age: 49
End: 2019-10-28
Payer: COMMERCIAL

## 2019-10-28 DIAGNOSIS — I82.409 RECURRENT DEEP VEIN THROMBOSIS (DVT) (H): ICD-10-CM

## 2019-10-28 DIAGNOSIS — D68.51 FACTOR V LEIDEN (H): ICD-10-CM

## 2019-10-28 DIAGNOSIS — D68.62 LUPUS ANTICOAGULANT DISORDER (H): ICD-10-CM

## 2019-10-28 LAB — INR POINT OF CARE: 2.5 (ref 0.86–1.14)

## 2019-10-28 PROCEDURE — 85610 PROTHROMBIN TIME: CPT | Mod: QW

## 2019-10-28 PROCEDURE — 99207 ZZC NO CHARGE NURSE ONLY: CPT

## 2019-10-28 PROCEDURE — 36416 COLLJ CAPILLARY BLOOD SPEC: CPT

## 2019-10-28 NOTE — PROGRESS NOTES
ANTICOAGULATION FOLLOW-UP CLINIC VISIT    Patient Name:  Ignacio Cooper  Date:  10/28/2019  Contact Type:  Face to Face    SUBJECTIVE:  Patient Findings     Positives:   Change in activity (using crutches, still ambulating well), Missed doses (Recent warfarin hold), Other complaints (had knee surgery on both knees)    Comments:   Patient was bridging with Lovenox, used her last injection today. Per protocol, okay to stop since INR is >2.3. Patient will take a little lower dose today, then resume her prior maintenance dose. Recheck the INR in 8 days at [facility].     Patient is no longer taking pain medications. Writer discussed the increased inflammation can cause the INR to increase. If patient's pain worsens or she has an concerns she should return sooner than 8 days. She verbalized understanding.         Clinical Outcomes     Comments:   Patient was bridging with Lovenox, used her last injection today. Per protocol, okay to stop since INR is >2.3. Patient will take a little lower dose today, then resume her prior maintenance dose. Recheck the INR in 8 days at [facility].     Patient is no longer taking pain medications. Writer discussed the increased inflammation can cause the INR to increase. If patient's pain worsens or she has an concerns she should return sooner than 8 days. She verbalized understanding.            OBJECTIVE    INR Protime   Date Value Ref Range Status   10/28/2019 2.5 (A) 0.86 - 1.14 Final       ASSESSMENT / PLAN  INR assessment THER    Recheck INR In: 8 DAYS    INR Location Clinic      Anticoagulation Summary  As of 10/28/2019    INR goal:   2.0-3.0   TTR:   71.2 % (11.7 mo)   INR used for dosin.5 (10/28/2019)   Warfarin maintenance plan:   5 mg (5 mg x 1), then 5 mg (5 mg x 1), then 7.5 mg (5 mg x 1.5) repeating every 3 days   Full warfarin instructions:   10/28: 5 mg; Otherwise 5 mg, then 5 mg, then 7.5 mg repeating every 3 days   Average weekly warfarin total:   40.833 mg   Plan last  modified:   Sadie Olivas RN (1/24/2019)   Next INR check:   11/5/2019   Priority:   INR   Target end date:   Indefinite    Indications    Lupus anticoagulant disorder (H) [D68.62]  Factor V Leiden (H) [D68.51]  Recurrent deep vein thrombosis (DVT) (H) [I82.409]             Anticoagulation Episode Summary     INR check location:       Preferred lab:       Send INR reminders to:   [facility]    Comments:   *Patient switched to [facility] recently but has been taking warfarin for 19 years per patient. Recently moved to area.      Anticoagulation Care Providers     Provider Role Specialty Phone number    Fidelia Forbes MD [facility] [phone]            See the Encounter Report to view Anticoagulation Flowsheet and Dosing Calendar (Go to Encounters tab in chart review, and find the Anticoagulation Therapy Visit)        Koki Ascencio RN [facility]

## 2019-11-05 ENCOUNTER — ANTICOAGULATION THERAPY VISIT (OUTPATIENT)
Dept: ANTICOAGULATION | Facility: CLINIC | Age: 49
End: 2019-11-05
Payer: COMMERCIAL

## 2019-11-05 DIAGNOSIS — D68.62 LUPUS ANTICOAGULANT DISORDER (H): ICD-10-CM

## 2019-11-05 DIAGNOSIS — Z79.01 LONG TERM CURRENT USE OF ANTICOAGULANT THERAPY: ICD-10-CM

## 2019-11-05 DIAGNOSIS — I82.409 RECURRENT DEEP VEIN THROMBOSIS (DVT) (H): ICD-10-CM

## 2019-11-05 DIAGNOSIS — D68.51 FACTOR V LEIDEN (H): ICD-10-CM

## 2019-11-05 LAB — INR POINT OF CARE: 2.6 (ref 0.86–1.14)

## 2019-11-05 PROCEDURE — 99207 ZZC NO CHARGE NURSE ONLY: CPT

## 2019-11-05 PROCEDURE — 85610 PROTHROMBIN TIME: CPT | Mod: QW

## 2019-11-05 PROCEDURE — 36416 COLLJ CAPILLARY BLOOD SPEC: CPT

## 2019-11-05 RX ORDER — WARFARIN SODIUM 5 MG/1
TABLET ORAL
Qty: 110 TABLET | Refills: 0 | Status: SHIPPED | OUTPATIENT
Start: 2019-11-05 | End: 2020-04-16

## 2019-11-05 NOTE — PROGRESS NOTES
ANTICOAGULATION FOLLOW-UP CLINIC VISIT    Patient Name:  Ignacio Cooper  Date:  2019  Contact Type:  Face to Face    SUBJECTIVE:  Patient Findings     Comments:   No changes in medications, activity, or diet noted. No concerns with clotting, bleeding, or increased bruising noted. Took warfarin as prescribed.  Patient is to continue maintenance warfarin plan, and check INR in 3 weeks. Pt requesting to go back to [facility].   Patient verbalizes understanding and agrees to plan. No further questions or concerns.        Clinical Outcomes     Negatives:   Major bleeding event, Thromboembolic event, Anticoagulation-related hospital admission, Anticoagulation-related ED visit, Anticoagulation-related fatality    Comments:   No changes in medications, activity, or diet noted. No concerns with clotting, bleeding, or increased bruising noted. Took warfarin as prescribed.  Patient is to continue maintenance warfarin plan, and check INR in 3 weeks. Pt requesting to go back to [facility].   Patient verbalizes understanding and agrees to plan. No further questions or concerns.           OBJECTIVE    INR Protime   Date Value Ref Range Status   2019 2.6 (A) 0.86 - 1.14 Final       ASSESSMENT / PLAN  INR assessment THER    Recheck INR In: 3 WEEKS    INR Location Clinic      Anticoagulation Summary  As of 2019    INR goal:   2.0-3.0   TTR:   71.9 % (11.9 mo)   INR used for dosin.6 (2019)   Warfarin maintenance plan:   5 mg (5 mg x 1), then 5 mg (5 mg x 1), then 7.5 mg (5 mg x 1.5) repeating every 3 days   Full warfarin instructions:   5 mg, then 5 mg, then 7.5 mg repeating every 3 days   Average weekly warfarin total:   40.833 mg   No change documented:   Martha Schilling RN   Plan last modified:   Sadie Olivas RN (2019)   Next INR check:   2019   Priority:   INR   Target end date:   Indefinite    Indications    Lupus anticoagulant disorder (H) [D68.62]  Factor V Leiden (H) [D68.51]  Recurrent  deep vein thrombosis (DVT) (H) [I82.409]             Anticoagulation Episode Summary     INR check location:       Preferred lab:       Send INR reminders to:   [facility]    Comments:   *Patient switched to [facility] recently but has been taking warfarin for 19 years per patient. Recently moved to area.      Anticoagulation Care Providers     Provider Role Specialty Phone number    Fidelia Forbes MD [facility] [phone]            See the Encounter Report to view Anticoagulation Flowsheet and Dosing Calendar (Go to Encounters tab in chart review, and find the Anticoagulation Therapy Visit)        Martha Schilling RN

## 2019-11-25 DIAGNOSIS — J45.30 MILD PERSISTENT ASTHMA, UNSPECIFIED WHETHER COMPLICATED: ICD-10-CM

## 2019-11-25 NOTE — TELEPHONE ENCOUNTER
"Requested Prescriptions   Pending Prescriptions Disp Refills     BREO ELLIPTA 200-25 MCG/INH Inhaler [Pharmacy Med Name: BREO ELLIPTA 200-25MCG/INH AEPB]  11     Sig: INHALE ONE PUFF BY MOUTH ONCE DAILY       Inhaled Steroids Protocol Passed - 11/25/2019 10:17 AM        Passed - Patient is age 12 or older        Passed - Asthma control assessment score within normal limits in last 6 months     Please review ACT score.   ACT Total Scores 10/8/2019   ACT TOTAL SCORE (Goal Greater than or Equal to 20) 25   In the past 12 months, how many times did you visit the emergency room for your asthma without being admitted to the hospital? 0   In the past 12 months, how many times were you hospitalized overnight because of your asthma? 0             Passed - Medication is active on med list        Passed - Recent (6 mo) or future (30 days) visit within the authorizing provider's specialty     Patient had office visit in the last 6 months or has a visit in the next 30 days with authorizing provider or within the authorizing provider's specialty.  See \"Patient Info\" tab in inbasket, or \"Choose Columns\" in Meds & Orders section of the refill encounter.            Last Written Prescription Date:  11/14/2018  Last Fill Quantity: 1 inhaler,  # refills: 11   Last office visit: 10/8/2019 with prescribing provider:  Saturnino   Future Office Visit:      "

## 2019-11-26 ENCOUNTER — ANTICOAGULATION THERAPY VISIT (OUTPATIENT)
Dept: ANTICOAGULATION | Facility: CLINIC | Age: 49
End: 2019-11-26
Payer: COMMERCIAL

## 2019-11-26 DIAGNOSIS — D68.51 FACTOR V LEIDEN (H): ICD-10-CM

## 2019-11-26 DIAGNOSIS — D68.62 LUPUS ANTICOAGULANT DISORDER (H): ICD-10-CM

## 2019-11-26 DIAGNOSIS — I82.409 RECURRENT DEEP VEIN THROMBOSIS (DVT) (H): ICD-10-CM

## 2019-11-26 LAB — INR POINT OF CARE: 2.4 (ref 0.86–1.14)

## 2019-11-26 PROCEDURE — 85610 PROTHROMBIN TIME: CPT | Mod: QW

## 2019-11-26 PROCEDURE — 99207 ZZC NO CHARGE NURSE ONLY: CPT

## 2019-11-26 PROCEDURE — 36416 COLLJ CAPILLARY BLOOD SPEC: CPT

## 2019-11-26 NOTE — PROGRESS NOTES
ANTICOAGULATION FOLLOW-UP CLINIC VISIT    Patient Name:  Ignacio Cooper  Date:  2019  Contact Type:  Face to Face    SUBJECTIVE:  Patient Findings     Comments:   No changes in medications, activity, or diet noted. No concerns with clotting, bleeding, or increased bruising noted. Took warfarin as prescribed.  Patient is to continue maintenance warfarin plan, and check INR in 6 weeks.  Patient verbalizes understanding and agrees to plan. No further questions or concerns.        Clinical Outcomes     Negatives:   Major bleeding event, Thromboembolic event, Anticoagulation-related hospital admission, Anticoagulation-related ED visit, Anticoagulation-related fatality    Comments:   No changes in medications, activity, or diet noted. No concerns with clotting, bleeding, or increased bruising noted. Took warfarin as prescribed.  Patient is to continue maintenance warfarin plan, and check INR in 6 weeks.  Patient verbalizes understanding and agrees to plan. No further questions or concerns.           OBJECTIVE    INR Protime   Date Value Ref Range Status   2019 2.4 (A) 0.86 - 1.14 Final       ASSESSMENT / PLAN  INR assessment THER    Recheck INR In: 6 WEEKS    INR Location Clinic      Anticoagulation Summary  As of 2019    INR goal:   2.0-3.0   TTR:   72.4 % (1 y)   INR used for dosin.4 (2019)   Warfarin maintenance plan:   5 mg (5 mg x 1), then 5 mg (5 mg x 1), then 7.5 mg (5 mg x 1.5) repeating every 3 days   Full warfarin instructions:   5 mg, then 5 mg, then 7.5 mg repeating every 3 days   Average weekly warfarin total:   40.833 mg   No change documented:   Martha Schilling RN   Plan last modified:   Sadie Olivas RN (2019)   Next INR check:   2020   Priority:   Maintenance   Target end date:   Indefinite    Indications    Lupus anticoagulant disorder (H) [D68.62]  Factor V Leiden (H) [D68.51]  Recurrent deep vein thrombosis (DVT) (H) [I82.409]             Anticoagulation  Episode Summary     INR check location:       Preferred lab:       Send INR reminders to:   [facility]    Comments:   *Patient switched to [facility] recently but has been taking warfarin for 19 years per patient. Recently moved to area.      Anticoagulation Care Providers     Provider Role Specialty Phone number    Fidelia Forbes MD [facility] [phone]            See the Encounter Report to view Anticoagulation Flowsheet and Dosing Calendar (Go to Encounters tab in chart review, and find the Anticoagulation Therapy Visit)        Martha Schilling RN

## 2019-11-26 NOTE — TELEPHONE ENCOUNTER
Prescription approved per [facility].  Monica Hernandez RN

## 2020-01-10 ENCOUNTER — ANTICOAGULATION THERAPY VISIT (OUTPATIENT)
Dept: ANTICOAGULATION | Facility: CLINIC | Age: 50
End: 2020-01-10
Payer: COMMERCIAL

## 2020-01-10 DIAGNOSIS — I82.409 RECURRENT DEEP VEIN THROMBOSIS (DVT) (H): ICD-10-CM

## 2020-01-10 DIAGNOSIS — D68.51 FACTOR V LEIDEN (H): ICD-10-CM

## 2020-01-10 DIAGNOSIS — D68.62 LUPUS ANTICOAGULANT DISORDER (H): ICD-10-CM

## 2020-01-10 LAB — INR POINT OF CARE: 2.5 (ref 0.86–1.14)

## 2020-01-10 PROCEDURE — 36416 COLLJ CAPILLARY BLOOD SPEC: CPT

## 2020-01-10 PROCEDURE — 99207 ZZC NO CHARGE NURSE ONLY: CPT

## 2020-01-10 PROCEDURE — 85610 PROTHROMBIN TIME: CPT | Mod: QW

## 2020-01-10 NOTE — PROGRESS NOTES
ANTICOAGULATION FOLLOW-UP CLINIC VISIT    Patient Name:  Ignacio Cooper  Date:  1/10/2020  Contact Type:  Face to Face    SUBJECTIVE:  Patient Findings     Comments:   Patient reports no changes in medication, activity, or diet. Patient reports no changes in health. Patient reports has taken warfarin as instructed. Patient reports no increased bruising or bleeding and no signs or symptoms of a blood clot. Will plan to continue maintenance dose and recheck INR in  6 weeks. Patient to call [facility] with any changes or concerns. Patient verbalized understanding of all instructions, denies questions or concerns at this time.             Clinical Outcomes     Negatives:   Major bleeding event, Thromboembolic event, Anticoagulation-related hospital admission, Anticoagulation-related ED visit, Anticoagulation-related fatality    Comments:   Patient reports no changes in medication, activity, or diet. Patient reports no changes in health. Patient reports has taken warfarin as instructed. Patient reports no increased bruising or bleeding and no signs or symptoms of a blood clot. Will plan to continue maintenance dose and recheck INR in  6 weeks. Patient to call [facility] with any changes or concerns. Patient verbalized understanding of all instructions, denies questions or concerns at this time.                OBJECTIVE    INR Protime   Date Value Ref Range Status   01/10/2020 2.5 (A) 0.86 - 1.14 Final       ASSESSMENT / PLAN  INR assessment THER    Recheck INR In: 6 WEEKS    INR Location Clinic      Anticoagulation Summary  As of 1/10/2020    INR goal:   2.0-3.0   TTR:   72.7 % (1 y)   INR used for dosin.5 (1/10/2020)   Warfarin maintenance plan:   5 mg (5 mg x 1), then 5 mg (5 mg x 1), then 7.5 mg (5 mg x 1.5) repeating every 3 days   Full warfarin instructions:   5 mg, then 5 mg, then 7.5 mg repeating every 3 days   Average weekly warfarin total:   40.833 mg   No change documented:   Jeimy Ramos, RN   Plan last  modified:   Sadie Olivas RN (1/24/2019)   Next INR check:   2/25/2020   Priority:   Maintenance   Target end date:   Indefinite    Indications    Lupus anticoagulant disorder (H) [D68.62]  Factor V Leiden (H) [D68.51]  Recurrent deep vein thrombosis (DVT) (H) [I82.409]             Anticoagulation Episode Summary     INR check location:       Preferred lab:       Send INR reminders to:   [facility]    Comments:   *Patient switched to [facility] recently but has been taking warfarin for 19 years per patient. Recently moved to area.      Anticoagulation Care Providers     Provider Role Specialty Phone number    Fidelia Forbes MD [facility] [phone]            See the Encounter Report to view Anticoagulation Flowsheet and Dosing Calendar (Go to Encounters tab in chart review, and find the Anticoagulation Therapy Visit)    Patient declined AVS today.     Jeimy Ramos RN

## 2020-02-25 ENCOUNTER — ANTICOAGULATION THERAPY VISIT (OUTPATIENT)
Dept: ANTICOAGULATION | Facility: CLINIC | Age: 50
End: 2020-02-25
Payer: COMMERCIAL

## 2020-02-25 DIAGNOSIS — D68.51 FACTOR V LEIDEN (H): ICD-10-CM

## 2020-02-25 DIAGNOSIS — D68.62 LUPUS ANTICOAGULANT DISORDER (H): ICD-10-CM

## 2020-02-25 DIAGNOSIS — I82.409 RECURRENT DEEP VEIN THROMBOSIS (DVT) (H): ICD-10-CM

## 2020-02-25 LAB — INR POINT OF CARE: 2.7 (ref 0.86–1.14)

## 2020-02-25 PROCEDURE — 36416 COLLJ CAPILLARY BLOOD SPEC: CPT

## 2020-02-25 PROCEDURE — 85610 PROTHROMBIN TIME: CPT | Mod: QW

## 2020-02-25 PROCEDURE — 99207 ZZC NO CHARGE NURSE ONLY: CPT

## 2020-02-25 NOTE — PROGRESS NOTES
ANTICOAGULATION FOLLOW-UP CLINIC VISIT    Patient Name:  Ignacio Cooper  Date:  2020  Contact Type:  Face to Face    SUBJECTIVE:  Patient Findings     Comments:   Patient reports no changes in medication, activity, or diet. Patient reports no changes in health. Patient reports has taken warfarin as instructed. Patient reports no increased bruising or bleeding and no signs or symptoms of a blood clot.   Will plan to continue maintenance dose and recheck INR in 6 weeks. Patient to call [facility] with any changes or concerns. Patient verbalized understanding of all instructions, denies questions or concerns at this time.             Clinical Outcomes     Negatives:   Major bleeding event, Thromboembolic event, Anticoagulation-related hospital admission, Anticoagulation-related ED visit, Anticoagulation-related fatality    Comments:   Patient reports no changes in medication, activity, or diet. Patient reports no changes in health. Patient reports has taken warfarin as instructed. Patient reports no increased bruising or bleeding and no signs or symptoms of a blood clot.   Will plan to continue maintenance dose and recheck INR in 6 weeks. Patient to call [facility] with any changes or concerns. Patient verbalized understanding of all instructions, denies questions or concerns at this time.                OBJECTIVE    INR Protime   Date Value Ref Range Status   2020 2.7 (A) 0.86 - 1.14 Final       ASSESSMENT / PLAN  INR assessment THER    Recheck INR In: 6 WEEKS    INR Location Clinic      Anticoagulation Summary  As of 2020    INR goal:   2.0-3.0   TTR:   85.3 % (1 y)   INR used for dosin.7 (2020)   Warfarin maintenance plan:   5 mg (5 mg x 1), then 5 mg (5 mg x 1), then 7.5 mg (5 mg x 1.5) repeating every 3 days   Full warfarin instructions:   5 mg, then 5 mg, then 7.5 mg repeating every 3 days   Average weekly warfarin total:   40.833 mg   No change documented:   Jeimy Ramos, RN   Plan last  modified:   Sadie Olivas RN (1/24/2019)   Next INR check:   4/7/2020   Priority:   Maintenance   Target end date:   Indefinite    Indications    Lupus anticoagulant disorder (H) [D68.62]  Factor V Leiden (H) [D68.51]  Recurrent deep vein thrombosis (DVT) (H) [I82.409]             Anticoagulation Episode Summary     INR check location:       Preferred lab:       Send INR reminders to:   [facility]    Comments:   *Patient switched to [facility] recently but has been taking warfarin for 19 years per patient. Recently moved to area.      Anticoagulation Care Providers     Provider Role Specialty Phone number    Fidelia Forbes MD [facility] [phone]            See the Encounter Report to view Anticoagulation Flowsheet and Dosing Calendar (Go to Encounters tab in chart review, and find the Anticoagulation Therapy Visit)    Patient declined AVS today.     Jeimy Ramos RN

## 2020-03-20 DIAGNOSIS — D68.62 LUPUS ANTICOAGULANT DISORDER (H): ICD-10-CM

## 2020-03-20 DIAGNOSIS — I82.409 RECURRENT DEEP VEIN THROMBOSIS (DVT) (H): Primary | ICD-10-CM

## 2020-03-20 DIAGNOSIS — D68.51 FACTOR V LEIDEN (H): ICD-10-CM

## 2020-04-16 DIAGNOSIS — Z79.01 LONG TERM CURRENT USE OF ANTICOAGULANT THERAPY: ICD-10-CM

## 2020-04-16 DIAGNOSIS — D68.51 FACTOR V LEIDEN (H): ICD-10-CM

## 2020-04-16 DIAGNOSIS — I82.409 RECURRENT DEEP VEIN THROMBOSIS (DVT) (H): ICD-10-CM

## 2020-04-16 DIAGNOSIS — D68.62 LUPUS ANTICOAGULANT DISORDER (H): ICD-10-CM

## 2020-04-16 RX ORDER — WARFARIN SODIUM 5 MG/1
TABLET ORAL
Qty: 110 TABLET | Refills: 1 | Status: SHIPPED | OUTPATIENT
Start: 2020-04-16

## 2020-04-16 NOTE — TELEPHONE ENCOUNTER
Anticoagulation Monitoring Instructions   Latest Ref Rng & Units    2/25/2020 5 mg, then 5 mg, then 7.5 mg repeating every 3 days   Prescription approved per [facility] Refill Protocol.  Romi De La Cruz, RN  Anticoagulation Nurse - [facility]

## 2020-05-12 ENCOUNTER — TELEPHONE (OUTPATIENT)
Dept: FAMILY MEDICINE | Facility: CLINIC | Age: 50
End: 2020-05-12

## 2020-05-12 NOTE — TELEPHONE ENCOUNTER
Ignacio Cooper is overdue for INR check.      Left message for patient to call and schedule INR check as soon as possible. If returning call, please schedule.      Lyle Bay RN, BSN, PHN  Anticoagulation Clinic   [phone]

## 2020-05-19 ENCOUNTER — ANTICOAGULATION THERAPY VISIT (OUTPATIENT)
Dept: FAMILY MEDICINE | Facility: CLINIC | Age: 50
End: 2020-05-19

## 2020-05-19 DIAGNOSIS — I82.409 RECURRENT DEEP VEIN THROMBOSIS (DVT) (H): ICD-10-CM

## 2020-05-19 DIAGNOSIS — D68.51 FACTOR V LEIDEN (H): ICD-10-CM

## 2020-05-19 DIAGNOSIS — D68.62 LUPUS ANTICOAGULANT DISORDER (H): ICD-10-CM

## 2020-05-19 LAB
CAPILLARY BLOOD COLLECTION: NORMAL
INR PPP: 2.6 (ref 0.86–1.14)

## 2020-05-19 PROCEDURE — 85610 PROTHROMBIN TIME: CPT | Performed by: NURSE PRACTITIONER

## 2020-05-19 PROCEDURE — 99207 ZZC NO CHARGE NURSE ONLY: CPT | Performed by: NURSE PRACTITIONER

## 2020-05-19 PROCEDURE — 36416 COLLJ CAPILLARY BLOOD SPEC: CPT | Performed by: NURSE PRACTITIONER

## 2020-05-19 NOTE — PROGRESS NOTES
ANTICOAGULATION FOLLOW-UP CLINIC VISIT    Patient Name:  Ignacio Cooper  Date:  2020  Contact Type:  Telephone    SUBJECTIVE:  Patient Findings     Comments:   No changes in medications, activity, health, or diet noted. No bleeding or increased bruising noted. Took warfarin as prescribed.  Patient will continue weekly maintenance dose. INR is therapeutic.   Recheck in 6 weeks.  Patient verbalizes understanding and agrees to plan. No further questions or concerns.          Clinical Outcomes     Negatives:   Major bleeding event, Thromboembolic event, Anticoagulation-related hospital admission, Anticoagulation-related ED visit, Anticoagulation-related fatality    Comments:   No changes in medications, activity, health, or diet noted. No bleeding or increased bruising noted. Took warfarin as prescribed.  Patient will continue weekly maintenance dose. INR is therapeutic.   Recheck in 6 weeks.  Patient verbalizes understanding and agrees to plan. No further questions or concerns.             OBJECTIVE    Recent labs: (last 7 days)     20  1101   INR 2.60*       ASSESSMENT / PLAN  INR assessment THER    Recheck INR In: 6 WEEKS    INR Location Outside lab      Anticoagulation Summary  As of 2020    INR goal:   2.0-3.0   TTR:   98.1 % (1 y)   INR used for dosin.60 (2020)   Warfarin maintenance plan:   5 mg (5 mg x 1), then 5 mg (5 mg x 1), then 7.5 mg (5 mg x 1.5) repeating every 3 days   Full warfarin instructions:   5 mg, then 5 mg, then 7.5 mg repeating every 3 days   Average weekly warfarin total:   40.833 mg   No change documented:   Lyle Bay RN   Plan last modified:   Sadie Olivas RN (2019)   Next INR check:   2020   Priority:   Maintenance   Target end date:   Indefinite    Indications    Lupus anticoagulant disorder (H) [D68.62]  Factor V Leiden (H) [D68.51]  Recurrent deep vein thrombosis (DVT) (H) [I82.409]             Anticoagulation Episode Summary     INR  check location:       Preferred lab:       Send INR reminders to:   [facility]    Comments:   *Patient switched to [facility] recently but has been taking warfarin for 19 years per patient. Recently moved to [facility]. Pt does not want AVS printed.      Anticoagulation Care Providers     Provider Role Specialty Phone number    Fidelia Forbes MD [facility] [phone]            See the Encounter Report to view Anticoagulation Flowsheet and Dosing Calendar (Go to Encounters tab in chart review, and find the Anticoagulation Therapy Visit)        Lyle Bay RN

## 2020-06-30 ENCOUNTER — ANTICOAGULATION THERAPY VISIT (OUTPATIENT)
Dept: ANTICOAGULATION | Facility: CLINIC | Age: 50
End: 2020-06-30

## 2020-06-30 DIAGNOSIS — D68.51 FACTOR V LEIDEN (H): ICD-10-CM

## 2020-06-30 DIAGNOSIS — D68.62 LUPUS ANTICOAGULANT DISORDER (H): ICD-10-CM

## 2020-06-30 DIAGNOSIS — I82.409 RECURRENT DEEP VEIN THROMBOSIS (DVT) (H): ICD-10-CM

## 2020-06-30 LAB
CAPILLARY BLOOD COLLECTION: NORMAL
INR PPP: 2.7 (ref 0.86–1.14)

## 2020-06-30 PROCEDURE — 36416 COLLJ CAPILLARY BLOOD SPEC: CPT | Performed by: NURSE PRACTITIONER

## 2020-06-30 PROCEDURE — 85610 PROTHROMBIN TIME: CPT | Performed by: NURSE PRACTITIONER

## 2020-06-30 NOTE — PROGRESS NOTES
ANTICOAGULATION MANAGEMENT     Patient Name:  Ignacio Cooper  Date:  2020    ASSESSMENT /SUBJECTIVE:    Today's INR result of 2.70 is therapeutic. Goal INR of 2.0-3.0      Warfarin dose taken: Warfarin taken as previously instructed    Diet: No new diet changes affecting INR    Medication changes/ interactions: No new medications/supplements affecting INR    Previous INR: Therapeutic 2.60    S/S of bleeding or thromboembolism: No    New injury or illness: No    Upcoming surgery, procedure or cardioversion: No    Additional findings: None      PLAN:    Spoke with Ignacio regarding INR result and instructed:     Warfarin Dosing Instructions: Continue your current warfarin dose 5mg, then 5mg, then 7.5mg repeating every 3 days    Instructed patient to follow up no later than: 6 weeks  Lab visit scheduled    Education provided: Importance of notifying clinic for changes in medications/health      Ignacio verbalizes understanding and agrees to warfarin dosing plan.    Instructed to call the Anticoagulation Clinic for any changes, questions or concerns. (#[phone])        OBJECTIVE:  INR   Date Value Ref Range Status   2020 2.70 (H) 0.86 - 1.14 Final     Comment:     This test is intended for monitoring Coumadin therapy.  Results are not   accurate in patients with prolonged INR due to factor deficiency.           INR assessment THER    Recheck INR In: 6 WEEKS    INR Location Clinic      Anticoagulation Summary  As of 2020    INR goal:   2.0-3.0   TTR:   98.1 % (1 y)   INR used for dosin.70 (2020)   Warfarin maintenance plan:   5 mg (5 mg x 1), then 5 mg (5 mg x 1), then 7.5 mg (5 mg x 1.5) repeating every 3 days   Full warfarin instructions:   5 mg, then 5 mg, then 7.5 mg repeating every 3 days   Average weekly warfarin total:   40.833 mg   No change documented:   Koki Ascencio RN   Plan last modified:   Sadie Olivas RN (2019)   Next INR check:   2020   Priority:    Maintenance   Target end date:   Indefinite    Indications    Lupus anticoagulant disorder (H) [D68.62]  Factor V Leiden (H) [D68.51]  Recurrent deep vein thrombosis (DVT) (H) [I26.790]             Anticoagulation Episode Summary     INR check location:       Preferred lab:       Send INR reminders to:   [facility]    Comments:   *Patient switched to [facility] recently but has been taking warfarin for 19 years per patient. Recently moved to area. Pt does not want AVS printed.      Anticoagulation Care Providers     Provider Role Specialty Phone number    Fidelia Forbes MD [facility] [phone]

## 2020-07-14 ENCOUNTER — TELEPHONE (OUTPATIENT)
Dept: FAMILY MEDICINE | Facility: CLINIC | Age: 50
End: 2020-07-14

## 2020-07-14 DIAGNOSIS — Z01.818 PREOP GENERAL PHYSICAL EXAM: Primary | ICD-10-CM

## 2020-07-14 NOTE — TELEPHONE ENCOUNTER
Coumadin Hold/Bridge Plan Requested:    Patient having surgery on 7/28.    Please advise Bridge & Coumadin Hold Plan.    Thank you.  Romi De La Cruz, RN  Anticoagulation Nurse - [facility]

## 2020-07-15 NOTE — TELEPHONE ENCOUNTER
She can do it on her pre-op but will have to be resulted before she can  the Lovenox injections from the pharmacy. CHINTAN Muñoz CNP

## 2020-07-15 NOTE — TELEPHONE ENCOUNTER
Needs SCr to confirm calculated Lovenox dose appropriate prior to procedure.    NALLELY-PROCEDURAL ANTICOAGULATION  MANAGEMENT    Assessment     Warfarin interruption plan for surgery on 2020 (updated from reported date originally 2020).    DVT, Coagulation defect and Factor V Leiden      Risk stratification for thromboembolism: moderate (2012 Chest guidelines)       heterozygous Factor V Leiden    only 1x Lupus anticoagulant test result 2018 was negative, patient reports historically previous testing was positive    VTE: 2016 Anticoagulation Forum clinical guidance recommends, no bridge therapy for most VTE patients interrupting warfarin with possible exceptions for VTE within previous month, prior hx recurrent VTE during anticoagulation therapy interruption, or undergoing a procedure with high for VTE  (joint replacement surgery or major abdominal cancer resection     Reasonable to bridge pre-procedure, and due to having TKA, which is high clot risk, patient needs post procedure bridge     Plan       Pre-Procedure:    Hold warfarin until after procedure startin2020     Lovenox 60 mg subq Q 12 hrs (1 mg/kg Q 12 hr for CrCl >30ml/min)     Start Lovenox: 2020    Last dose of Lovenox prior to procedure: 08/10/2020 AM       Post-Procedure:    Resume home warfarin dose if okay with provider doing procedure on night of procedure, 2020 PM: 10mg    Resume Lovenox ~ 24-48 hrs post procedure when okay with provider doing procedure. Continue until INR >= 2.3    Recheck INR 5-7 days after resuming warfarin   ?   Haylie Loza [facility]    Subjective/Objective:      Ignacio DENG Cooper, a 50 year old female    Reason for Anticoagulation: DVT, Coagulation defect and Factor V Leiden    Goal INR Range: 2.0-3.0    Patient bridged in past: Yes: 2019 for procedure    Pertinent History: N/A    Wt Readings from Last 3 Encounters:   10/08/19 61.9 kg (136 lb 6.4 oz)   19 62.1 kg (137 lb)   19  62.1 kg (137 lb)        Patient weight not recorded     Lab Results   Component Value Date    INR 2.70 (H) 06/30/2020    INR 2.60 (H) 05/19/2020    INR 2.7 (A) 02/25/2020     Lab Results   Component Value Date    HGB 12.3 10/08/2019    HCT 37.5 10/08/2019     10/08/2019     Lab Results   Component Value Date    CR 0.75 10/08/2019     CrCl cannot be calculated (Patient's most recent lab result is older than the maximum 10 days allowed.).

## 2020-07-15 NOTE — TELEPHONE ENCOUNTER
Surgery scheduled for 8/11.  Can she do this during her preop on 7/22 or do you want her to come in this week?    Monica Hernandez RN

## 2020-07-15 NOTE — TELEPHONE ENCOUNTER
Please notify Ignacio she needs to come in for lab only in the next couple days for creatinine for her surgery. CHINTAN Muñoz CNP

## 2020-08-11 ENCOUNTER — ANTICOAGULATION THERAPY VISIT (OUTPATIENT)
Dept: ANTICOAGULATION | Facility: CLINIC | Age: 50
End: 2020-08-11

## 2020-08-11 DIAGNOSIS — D68.51 FACTOR V LEIDEN (H): ICD-10-CM

## 2020-08-11 DIAGNOSIS — I82.409 RECURRENT DEEP VEIN THROMBOSIS (DVT) (H): ICD-10-CM

## 2020-08-11 DIAGNOSIS — D68.62 LUPUS ANTICOAGULANT DISORDER (H): ICD-10-CM

## 2020-08-11 LAB
CAPILLARY BLOOD COLLECTION: NORMAL
INR PPP: 3 (ref 0.86–1.14)

## 2020-08-11 PROCEDURE — 85610 PROTHROMBIN TIME: CPT | Performed by: NURSE PRACTITIONER

## 2020-08-11 PROCEDURE — 99207 ZZC NO CHARGE NURSE ONLY: CPT

## 2020-08-11 PROCEDURE — 36416 COLLJ CAPILLARY BLOOD SPEC: CPT | Performed by: NURSE PRACTITIONER

## 2020-08-11 NOTE — PROGRESS NOTES
"ANTICOAGULATION FOLLOW-UP CLINIC VISIT    Patient Name:  Ignacio Cooper  Date:  8/11/2020  Contact Type:  Telephone    SUBJECTIVE:  Patient Findings     Comments:   Pt did cancel her procedure - she is going to wait for the \"madness to be over\" referring to COVID.  No changes in medications, activity, or diet noted. No concerns with clotting, bleeding, or increased bruising noted. Took warfarin as prescribed.  Patient is to continue maintenance warfarin plan, and check INR in 6 weeks.  Patient verbalizes understanding and agrees to plan. No further questions or concerns.        Clinical Outcomes     Negatives:   Major bleeding event, Thromboembolic event, Anticoagulation-related hospital admission, Anticoagulation-related ED visit, Anticoagulation-related fatality    Comments:   Pt did cancel her procedure - she is going to wait for the \"madness to be over\" referring to COVID.  No changes in medications, activity, or diet noted. No concerns with clotting, bleeding, or increased bruising noted. Took warfarin as prescribed.  Patient is to continue maintenance warfarin plan, and check INR in 6 weeks.  Patient verbalizes understanding and agrees to plan. No further questions or concerns.           OBJECTIVE    Recent labs: (last 7 days)     08/11/20  0903   INR 3.00*       ASSESSMENT / PLAN  INR assessment THER    Recheck INR In: 6 WEEKS    INR Location Clinic      Anticoagulation Summary  As of 8/11/2020    INR goal:   2.0-3.0   TTR:   98.1 % (1 y)   INR used for dosing:   3.00 (8/11/2020)   Warfarin maintenance plan:   5 mg (5 mg x 1), then 5 mg (5 mg x 1), then 7.5 mg (5 mg x 1.5) repeating every 3 days   Full warfarin instructions:   5 mg, then 5 mg, then 7.5 mg repeating every 3 days   Average weekly warfarin total:   40.833 mg   No change documented:   Martha Schilling, RN   Plan last modified:   Sadie Olivas RN (1/24/2019)   Next INR check:   9/22/2020   Priority:   Maintenance   Target end date:   Indefinite "    Indications    Lupus anticoagulant disorder (H) [D68.62]  Factor V Leiden (H) [D68.51]  Recurrent deep vein thrombosis (DVT) (H) [I82409]             Anticoagulation Episode Summary     INR check location:       Preferred lab:       Send INR reminders to:   [facility]    Comments:   *Patient switched to [facility] recently but has been taking warfarin for 19 years per patient. Recently moved to area. Pt does not want AVS printed.      Anticoagulation Care Providers     Provider Role Specialty Phone number    Fidelia Forbes MD [facility] [phone]            See the Encounter Report to view Anticoagulation Flowsheet and Dosing Calendar (Go to Encounters tab in chart review, and find the Anticoagulation Therapy Visit)        aMrtha Schilling RN

## 2020-09-22 ENCOUNTER — TELEPHONE (OUTPATIENT)
Dept: ANTICOAGULATION | Facility: CLINIC | Age: 50
End: 2020-09-22

## 2020-09-22 ENCOUNTER — ANTICOAGULATION THERAPY VISIT (OUTPATIENT)
Dept: ANTICOAGULATION | Facility: CLINIC | Age: 50
End: 2020-09-22

## 2020-09-22 DIAGNOSIS — D68.51 FACTOR V LEIDEN (H): ICD-10-CM

## 2020-09-22 DIAGNOSIS — D68.62 LUPUS ANTICOAGULANT DISORDER (H): Primary | ICD-10-CM

## 2020-09-22 DIAGNOSIS — D68.62 LUPUS ANTICOAGULANT DISORDER (H): ICD-10-CM

## 2020-09-22 DIAGNOSIS — I82.409 RECURRENT DEEP VEIN THROMBOSIS (DVT) (H): ICD-10-CM

## 2020-09-22 DIAGNOSIS — Z79.01 LONG TERM CURRENT USE OF ANTICOAGULANT THERAPY: ICD-10-CM

## 2020-09-22 DIAGNOSIS — I82.409 DVT (DEEP VENOUS THROMBOSIS) (H): ICD-10-CM

## 2020-09-22 LAB
CAPILLARY BLOOD COLLECTION: NORMAL
INR PPP: 3.1 (ref 0.86–1.14)

## 2020-09-22 PROCEDURE — 36416 COLLJ CAPILLARY BLOOD SPEC: CPT | Performed by: NURSE PRACTITIONER

## 2020-09-22 PROCEDURE — 99207 ZZC NO CHARGE NURSE ONLY: CPT

## 2020-09-22 PROCEDURE — 85610 PROTHROMBIN TIME: CPT | Performed by: NURSE PRACTITIONER

## 2020-09-22 NOTE — PROGRESS NOTES
VM left for pt to return call to [facility] [phone]. Dosing instructions not given to pt.  Tentative plan: Supra therapeutic INR - changes? Recheck INR in 2 weeks.  Martha Schilling RN on 9/22/2020 at 10:49 AM  ANTICOAGULATION FOLLOW-UP CLINIC VISIT    Patient Name:  Ignacio Cooper  Date:  9/22/2020  Contact Type:  Telephone    SUBJECTIVE:  Patient Findings     Comments:   No changes in medications, activity, or diet noted. No concerns with clotting, bleeding, or increased bruising noted. Took warfarin as prescribed.  Patient is to continue maintenance warfarin plan, and check INR in 2 weeks. Pt refused and stated that she is not comfortable coming into clinics. She states that in the past she has just ate a salad and rechecked her INR in 6 weeks. Informed her protocol was changed - pt interrupted and prefers to schedule for 6 week recheck.  Patient educated on the increased risk for bleeding, precautions to take, and when to seek medical attention.  Patient verbalizes understanding and agrees to plan. No further questions or concerns.        Clinical Outcomes     Negatives:   Major bleeding event, Thromboembolic event, Anticoagulation-related hospital admission, Anticoagulation-related ED visit, Anticoagulation-related fatality    Comments:   No changes in medications, activity, or diet noted. No concerns with clotting, bleeding, or increased bruising noted. Took warfarin as prescribed.  Patient is to continue maintenance warfarin plan, and check INR in 2 weeks. Pt refused and stated that she is not comfortable coming into clinics. She states that in the past she has just ate a salad and rechecked her INR in 6 weeks. Informed her protocol was changed - pt interrupted and prefers to schedule for 6 week recheck.  Patient educated on the increased risk for bleeding, precautions to take, and when to seek medical attention.  Patient verbalizes understanding and agrees to plan. No further questions or concerns.            OBJECTIVE    Recent labs: (last 7 days)     09/22/20  0855   INR 3.10*       ASSESSMENT / PLAN  INR assessment SUPRA    Recheck INR In: 2 WEEKS    INR Location Clinic      Anticoagulation Summary  As of 9/22/2020    INR goal:   2.0-3.0   TTR:   88.5 % (1 y)   INR used for dosing:   3.10! (9/22/2020)   Warfarin maintenance plan:   5 mg (5 mg x 1), then 5 mg (5 mg x 1), then 7.5 mg (5 mg x 1.5) repeating every 3 days   Full warfarin instructions:   5 mg, then 5 mg, then 7.5 mg repeating every 3 days   Average weekly warfarin total:   40.833 mg   No change documented:   Martha Schilling RN   Plan last modified:   Sadie Olivas RN (1/24/2019)   Next INR check:   11/3/2020   Priority:   High   Target end date:   Indefinite    Indications    Lupus anticoagulant disorder (H) [D68.62]  Factor V Leiden (H) [D68.51]  Recurrent deep vein thrombosis (DVT) (H) [I82.409]             Anticoagulation Episode Summary     INR check location:       Preferred lab:       Send INR reminders to:   [facility]    Comments:   *Patient switched to [facility] recently but has been taking warfarin for 19 years per patient. Recently moved to [facility]. Pt does not want AVS printed. Fidelia Forbes MD      Anticoagulation Care Providers     Provider Role Specialty Phone number    Fidelia Forbes MD [facility] [phone]            See the Encounter Report to view Anticoagulation Flowsheet and Dosing Calendar (Go to Encounters tab in chart review, and find the Anticoagulation Therapy Visit)        Martha Schilling RN

## 2020-09-22 NOTE — TELEPHONE ENCOUNTER
ANTICOAGULATION MANAGEMENT      Ignacio Cooper due for annual renewal of referral to anticoagulation monitoring. Order pended for your review and signature.      ANTICOAGULATION SUMMARY      Warfarin indication(s)     DVT  Lupus anticoagulant disorder  Factor V Leiden    Heart valve present?  NO       Current goal range   INR: 2.0-3.0     Goal appropriate for indication? Yes, INR 2-3 appropriate for hx of DVT, PE, hypercoagulable state, Afib, LVAD, or bileaflet AVR without risk factors     Current duration of therapy Indefinite/long term therapy   Time in Therapeutic Range (TTR)  (Goal > 60%) 88.5 %       Office visit with referring provider's group within last year yes on 10/8/19       Martha Schilling RN on 9/22/2020 at 10:33 AM

## 2020-11-03 ENCOUNTER — TELEPHONE (OUTPATIENT)
Dept: ANTICOAGULATION | Facility: CLINIC | Age: 50
End: 2020-11-03

## 2020-11-03 DIAGNOSIS — D68.51 FACTOR V LEIDEN (H): ICD-10-CM

## 2020-11-03 DIAGNOSIS — Z79.01 LONG TERM CURRENT USE OF ANTICOAGULANT THERAPY: ICD-10-CM

## 2020-11-03 DIAGNOSIS — I82.409 RECURRENT DEEP VEIN THROMBOSIS (DVT) (H): ICD-10-CM

## 2020-11-03 DIAGNOSIS — D68.62 LUPUS ANTICOAGULANT DISORDER (H): ICD-10-CM

## 2020-11-03 NOTE — TELEPHONE ENCOUNTER
ANTICOAGULATION     Ignacio Cooper is overdue for INR check.      Reminder letter sent    Lyle Bay RN

## 2020-11-03 NOTE — LETTER
November 3, 2020      Ignacio Cooper  [address]  [place] [address]      Dear Ignacio,    We are contacting you because our records show you were due for an INR on 11/3/2020.      There are potentially serious risks when taking warfarin without careful monitoring, and we want to make sure you are safely managed.    Please call the INR clinic at [phone] and we will be happy to help you schedule an appointment.  If there has been a change in your care, or other concerns, please let us know so we can help and/or update our records.    Sincerely,        Anticoagulation Clinic

## 2020-11-17 NOTE — TELEPHONE ENCOUNTER
ANTICOAGULATION     Ignacio Cooper is overdue for INR check.      Called pt and she states that she goes to another [facility] clinic now. She goes to [facility] and she will be discontinued from [facility].    Martha Schilling RN

## 2021-01-15 ENCOUNTER — HEALTH MAINTENANCE LETTER (OUTPATIENT)
Age: 51
End: 2021-01-15

## 2021-01-23 ENCOUNTER — HEALTH MAINTENANCE LETTER (OUTPATIENT)
Age: 51
End: 2021-01-23

## 2021-10-24 ENCOUNTER — HEALTH MAINTENANCE LETTER (OUTPATIENT)
Age: 51
End: 2021-10-24

## 2022-02-12 VITALS
BODY MASS INDEX: 22.71 KG/M2 | TEMPERATURE: 98.7 F | DIASTOLIC BLOOD PRESSURE: 72 MMHG | WEIGHT: 138.6 LBS | SYSTOLIC BLOOD PRESSURE: 111 MMHG | HEART RATE: 62 BPM

## 2022-02-12 VITALS
BODY MASS INDEX: 22.76 KG/M2 | DIASTOLIC BLOOD PRESSURE: 76 MMHG | SYSTOLIC BLOOD PRESSURE: 116 MMHG | TEMPERATURE: 98.4 F | HEIGHT: 66 IN | HEART RATE: 67 BPM | WEIGHT: 141.6 LBS

## 2022-02-13 ENCOUNTER — HEALTH MAINTENANCE LETTER (OUTPATIENT)
Age: 52
End: 2022-02-13

## 2022-02-16 NOTE — PROGRESS NOTES
Patient:   DONALD ESPITIA            MRN: [id number]            FIN: [id number]               Age:   47 years     Sex:  Female     :  1970   Associated Diagnoses:   None   Author:   Oral Campos MD      Chief Complaint   2017 12:19 PM CDT   Sore throat x2 days.  Exposed to strep.        History of Present Illness             The patient presents with a sore throat.  The location is generalized and both sides of the throat.  The onset was gradual.  There were relieving factors including medication.  It is described as aching and burning.  The severity is moderate.  The symptom occurs constantly.  The course is worsening.  Associated symptoms painful swallowing.        Review of Systems   Constitutional:  Negative.    Eye:  Negative.    Respiratory:  Negative.    Cardiovascular:  Negative.       Health Status   Allergies:    Allergic Reactions (Selected)  Severity Not Documented  Codeine (Nausea)   Medications:  (Selected)   Prescriptions  Prescribed  warfarin 5 mg oral tablet: See Instructions, Instructions: TAKE ONE AND ONE-HALF TABLET TO EQUAL 7.5MG BY MOUTH EVERY OTHER DAY ALTERNATING WITH ONE TABLET- 5MG- EVERY OTHER DAY., # 105 tab(s), 3 Refill(s), Type: Maintenance, Pharmacy: [facility] [place], TAKE ONE AN...  Documented Medications  Documented  ParaGard intrauteral device: 0 Refill(s), Type: Maintenance   Problem list:    All Problems  Lupus anticoagulant disorder / SNOMED CT 57101851 / Confirmed  Anticoagulated / ICD-9-CM V58.61 / Confirmed  Diastasis recti / SNOMED CT 054949086 / Confirmed  History of factor V Leiden mutation / SNOMED CT 419399920 / Confirmed  Menarche / ICD-9-CM V21.8 / Confirmed  Family history of clotting disorder / SNOMED CT 900713924 / Confirmed  History of DVT (deep vein thrombosis) / SNOMED CT 5430410700 / Confirmed  Resolved: Sprain of right wrist / ICD-9-.00  Resolved: Tendonitis, Achilles, left / ICD-9-.71  Resolved: Mononucleosis /  ICD-9-  Canceled: DVT (Deep Venous Thrombosis) / ICD-9-.40  Canceled: Family History of Breast Cancer in Sister / ICD-9-CM V16.3      Histories   Past Medical History:    Active  Diastasis recti (283740744): Onset in  at 37 years.  Menarche (V21.8): Onset in  at 15 years.  Lupus anticoagulant disorder (60770030)  History of factor V Leiden mutation (904655482)  Family history of clotting disorder (993733392)  History of DVT (deep vein thrombosis) (3208642077)  Comments:  2013 CST 1:36 PM CST - Asia Melissa  First DVT was on BCP. Second was a few mo's after 3rd pregnancy. Was then dx'd with Factor 5 Leiden and Lupus Anticoagant Disorder @ [facility] and was advised she'd need to be on Coumadin for life. Had 3rd DVT following 5 th preg due to delay resuming Coumadin.  Resolved  Sprain of right wrist (842.00): Onset on 2009 at 39 years.  Resolved.  Tendonitis, Achilles, left (726.71): Onset in  at 39 years.  Resolved.  Mononucleosis (075): Onset on 10/2/2008 at 38 years.  Resolved.   Family History:    Thyroid disease  Sister  Diabetes mellitus  Father  Breast cancer  Sister: onset at 35 .  Comments:  2013 1:31 PM - Asia Melissa  Premenopausal with second recurrence.  Fibromyalgia  Mother  CA - Cancer of kidney  Brother  Factor V Leiden..  Son (Oscar)  Daughter (Eden)  Lupus...  Mother     Procedure history:    HPV - Human papillomavirus test negative (SNOMED CT 3249734502) on 2013 at 43 Years.  Comments:  2013 8:34 PM - Asia Melissa  Low risk for cervical cancer. OK to go to 5 yr screening interval.  Recanalization (SNOMED CT 461668210) in  at 39 Years.  Comments:  2011 1:50 PM - Pepper Cardona  left iliac vein  Phlebectomy of lower limb vein (SNOMED CT 72751626) in  at 39 Years.   - Spontaneous vaginal delivery (SNOMED CT 2297630866).  Comments:  2010 9:27 AM - Rosina Stuart  X 5    2010 9:26 AM -  Rosina Stuart  X 4   Social History:        Alcohol Assessment: Denies Alcohol Use            Never      Tobacco Assessment: Denies Tobacco Use            Never      Substance Abuse Assessment: Denies Substance Abuse            Never      Employment and Education Assessment            Homemaker.                     Comments:                      10/26/2015 - Curtis WHALEN, Anita                     and       Home and Environment Assessment            Marital status: .  Spouse/Partner name: Silviano.            Spouse/Partner name: Silviano.  Lives with Self, Children, Spouse.  5 children.      Nutrition and Health Assessment            Type of diet: Regular.      Exercise and Physical Activity Assessment: Regular exercise            Exercise frequency: 1-3 times/week.  Exercise type: Running.      Sexual Assessment            Sexually active: Yes.  Sexual orientation: Heterosexual.  Contraceptive Use Details: Intrauterine device.      Other Assessment            First menses age 14.  Regular menses.  Menstrual duration 10-14 days.  Cycle interval 28-30 days.  No               abnormal pap smear.        Physical Examination   Vital Signs   5/12/2017 12:19 PM CDT Temperature Tympanic 98.7 DegF    Peripheral Pulse Rate 62 bpm    Systolic Blood Pressure 111 mmHg    Diastolic Blood Pressure 72 mmHg    Mean Arterial Pressure 85 mmHg      Measurements from flowsheet : Measurements   5/12/2017 12:19 PM CDT   Weight Measured - Standard                138.6 lb     General:  Alert and oriented, No acute distress.    HENT:  Normocephalic.         Throat: Tonsils ( Erythematous ), Pharynx ( Erythematous ).    Neck:  Supple.         Lymph nodes: Bilateral, Cervical chain, Anterior triangle, Palpable, Tender.    Neurologic:  Alert, Oriented.       Review / Management   Results review:  strept test neg.       Impression and Plan   Diagnosis   Orders     Orders (Selected)   Outpatient Orders  Ordered  Strep Grp A AG  IA  (Rapid Strep) (Request): Priority: Urgent, Sore throat  Prescriptions  Prescribed  warfarin 5 mg oral tablet: See Instructions, Instructions: TAKE ONE AND ONE-HALF TABLET TO EQUAL 7.5MG BY MOUTH EVERY OTHER DAY ALTERNATING WITH ONE TABLET- 5MG- EVERY OTHER DAY., # 105 tab(s), 3 Refill(s), Type: Maintenance, Pharmacy: [facility] [place], TAKE ONE AN...  Documented Medications  Documented  ParaGard intrauteral device: 0 Refill(s), Type: Maintenance.     Plan:       Follow-up: With Primary Care Provider, As needed or sooner if symptoms worsen.    Patient Instructions:  Launch follow-up (if licensed).

## 2022-07-20 ENCOUNTER — TRANSCRIBE ORDERS (OUTPATIENT)
Dept: VASCULAR SURGERY | Facility: CLINIC | Age: 52
End: 2022-07-20

## 2022-07-20 DIAGNOSIS — I87.2 VENOUS INSUFFICIENCY: ICD-10-CM

## 2022-07-20 DIAGNOSIS — I82.409: Primary | ICD-10-CM

## 2022-07-21 ENCOUNTER — TELEPHONE (OUTPATIENT)
Dept: OTHER | Facility: CLINIC | Age: 52
End: 2022-07-21

## 2022-07-21 NOTE — TELEPHONE ENCOUNTER
"Pt referred to [facility] by Marita Rosa MD for Recurrent deep vein thrombosis of lower extremity (H)  \"Hx of stents in left common iliac vein, now having leg pain after running\".     Pt is on Coumadin.     Pt needs to be scheduled for new in clinic consult with vascular medicine.  Will route to scheduling to coordinate an appointment at next available.     Appt note: new pt ref by Marita Rosa MD for Recurrent deep vein thrombosis of lower extremity (H) \"Hx of stents in left common iliac vein, now having leg pain after running\". Pt is on Coumadin.     FREYA CoppolaN, RN  [facility] [place]  "

## 2022-07-22 NOTE — TELEPHONE ENCOUNTER
Called pt to get appointment scheduled .    Pt stated she thought referral was for [place] location . Notified pt our locations are [place] and [facility] . Pt declined to make appointment.

## 2022-10-16 ENCOUNTER — HEALTH MAINTENANCE LETTER (OUTPATIENT)
Age: 52
End: 2022-10-16

## 2023-03-26 ENCOUNTER — HEALTH MAINTENANCE LETTER (OUTPATIENT)
Age: 53
End: 2023-03-26

## 2023-08-08 NOTE — Clinical Note
Please abstract the following data from this visit with this patient into the appropriate field in Epic:  Pap smear done on this date: 10/4/17 (approximately), by this group: [facility], results were normal.  
Private car

## 2024-05-24 NOTE — PROGRESS NOTES
Patient:   DONALD ESPITIA            MRN: [id number]            FIN: [id number]               Age:   47 years     Sex:  Female     :  1970   Associated Diagnoses:   Well adult exam   Author:   Oral Campos MD      Visit Information      Date of Service: 2017 12:00 pm  Performing Location: [facility]  Encounter#: [id number]      Primary Care Provider (PCP):  Oral Campos MD    NPI# [id number]   Visit type:  Annual exam.    Accompanied by:  No one.    Source of history:  Self.       Chief Complaint   2017 12:19 PM CST  Px, no pap      Well Adult History   Well Adult History   The general health status is good.  The effect on daily activities is no change in activity level, no change in eating habits, no change in sexual activity, no change in sleeping patterns and no change in work/school duties.  Associated symptoms.     Continues to struggle with left knee pain.  He is an avid runner and runs marathons and has been struggling with some left knee discomfort.  She has she been pain-free over the last month still concerned because she is to try to start increasing her training again in January.         Review of Systems   Constitutional:  No weakness, No fatigue, No decreased activity, No weight gain.    Eye:  No recent visual problem, No blurring, No double vision.    Ear/Nose/Mouth/Throat:  Negative.    Respiratory:  No shortness of breath, No cough.    Cardiovascular:  No chest pain, No peripheral edema.    Gastrointestinal:  No nausea, No vomiting, No diarrhea, No constipation, No abdominal pain.    Genitourinary:  No dysuria, No hematuria, No change in urine stream.    Gynecologic:  Negative.    Hematology/Lymphatics:  Negative.    Endocrine:  Negative.    Immunologic:  Negative.    Musculoskeletal:  Negative except as documented in history of present illness.    Integumentary:  No rash.    Neurologic:  Alert and oriented X4.    Psychiatric:  Negative.              Health Status   Allergies:    Allergic Reactions (Selected)  Severity Not Documented  Codeine (Nausea)   Problem list:    All Problems  Anticoagulated / ICD-9-CM V58.61 / Confirmed  Diastasis recti / SNOMED CT 830799273 / Confirmed  Family history of clotting disorder / SNOMED CT 729888307 / Confirmed  History of factor V Leiden mutation / SNOMED CT 306119408 / Confirmed  History of DVT (deep vein thrombosis) / SNOMED CT 9855792974 / Confirmed  Lupus anticoagulant disorder / SNOMED CT 08649867 / Confirmed  Menarche / ICD-9-CM V21.8 / Confirmed  Resolved: Mononucleosis / ICD-9-  Resolved: Sprain of right wrist / ICD-9-.00  Resolved: Tendonitis, Achilles, left / ICD-9-.71  Canceled: DVT (Deep Venous Thrombosis) / ICD-9-.40  Canceled: Family History of Breast Cancer in Sister / ICD-9-CM V16.3   Medications:  (Selected)   Prescriptions  Prescribed  Ventolin HFA 90 mcg/inh inhalation aerosol: 2 puff(s), INH, QID, PRN: for cough, # 1 EA, 11 Refill(s), Type: Maintenance, Pharmacy: [facility], 2 puff(s) inh qid,PRN:for cough  warfarin 5 mg oral tablet: See Instructions, Instructions: TAKE ONE AND ONE-HALF TABLET TO EQUAL 7.5MG BY MOUTH EVERY OTHER DAY ALTERNATING, # 105 unknown unit, 1 Refill(s), Type: Maintenance, Pharmacy: [facility]  Documented Medications  Documented  ParaGard intrauteral device: 0 Refill(s), Type: Maintenance      Histories   Past Medical History:    Active  Diastasis recti (651929814): Onset in 2007 at 37 years.  Menarche (V21.8): Onset in 1985 at 15 years.  Lupus anticoagulant disorder (57926574)  History of factor V Leiden mutation (583874638)  Family history of clotting disorder (353932406)  History of DVT (deep vein thrombosis) (0177981564)  Comments:  11/7/2013 CST 1:36 PM CST - Adina GARCIA, Asia  First DVT was on BCP. Second was a few mo's after 3rd pregnancy. Was then dx'd with Factor 5 Leiden and Lupus Anticoagant Disorder @  yes [place] and was advised she'd need to be on Coumadin for life. Had 3rd DVT following 5 th preg due to delay resuming Coumadin.  Resolved  Sprain of right wrist (842.00): Onset on 2009 at 39 years.  Resolved.  Tendonitis, Achilles, left (726.71): Onset in  at 39 years.  Resolved.  Mononucleosis (075): Onset on 10/2/2008 at 38 years.  Resolved.   Family History:    Thyroid disease  Sister  Diabetes mellitus  Father  Breast cancer  Sister: onset at 35 .  Comments:  2013 1:31 PM - [place] Melissa  Premenopausal with second recurrence.  Fibromyalgia  Mother  [place] - Cancer of kidney  Brother  Factor V Leiden..  Son (Oscar)  Daughter (Eden)  Lupus...  Mother     Procedure history:    HPV - Human papillomavirus test negative (SNOMED CT 1922462280) on 2013 at 43 Years.  Comments:  2013 8:34 PM - [place] Melissa  Low risk for cervical cancer. OK to go to 5 yr screening interval.  Recanalization (SNOMED CT 367335383) in  at 39 Years.  Comments:  2011 1:50 PM - Pepper Cardona  left iliac vein  Phlebectomy of lower limb vein (SNOMED CT 93118167) in  at 39 Years.   - Spontaneous vaginal delivery (SNOMED CT 2960079408).  Comments:  2010 9:27 AM - Rosina Stuart  X 5    2010 9:26 AM - Rosina Stuart  X 4   Social History:        Alcohol Assessment: Denies Alcohol Use            Never      Tobacco Assessment: Denies Tobacco Use            Never      Substance Abuse Assessment: Denies Substance Abuse            Never      Employment and Education Assessment            Homemaker.                     Comments:                      10/26/2015 - Anita Acevedo RN                     and       Home and Environment Assessment            Marital status: .  Spouse/Partner name: Silviano.            Spouse/Partner name: Silviano.  Lives with Self, Children, Spouse.  5 children.      Nutrition and Health Assessment            Type of diet: Regular.       Exercise and Physical Activity Assessment: Regular exercise            Exercise frequency: 1-3 times/week.  Exercise type: Running.      Sexual Assessment            Sexually active: Yes.  Sexual orientation: Heterosexual.  Contraceptive Use Details: Intrauterine device.      Other Assessment            First menses age 14.  Regular menses.  Menstrual duration 10-14 days.  Cycle interval 28-30 days.  No               abnormal pap smear.  ,        Alcohol Assessment: Denies Alcohol Use            Never      Tobacco Assessment: Denies Tobacco Use            Never      Substance Abuse Assessment: Denies Substance Abuse            Never      Employment and Education Assessment            Homemaker.                     Comments:                      10/26/2015 - Curtis WHALEN, Anita                     and       Home and Environment Assessment            Marital status: .  Spouse/Partner name: Silviano.            Spouse/Partner name: Silviano.  Lives with Self, Children, Spouse.  5 children.      Nutrition and Health Assessment            Type of diet: Regular.      Exercise and Physical Activity Assessment: Regular exercise            Exercise frequency: 1-3 times/week.  Exercise type: Running.      Sexual Assessment            Sexually active: Yes.  Sexual orientation: Heterosexual.  Contraceptive Use Details: Intrauterine device.      Other Assessment            First menses age 14.  Regular menses.  Menstrual duration 10-14 days.  Cycle interval 28-30 days.  No               abnormal pap smear.        Physical Examination   Vital Signs   12/14/2017 12:19 PM CST Temperature Tympanic 98.4 DegF    Peripheral Pulse Rate 67 bpm    HR Method Electronic    Systolic Blood Pressure 116 mmHg    Diastolic Blood Pressure 76 mmHg    Mean Arterial Pressure 89 mmHg    BP Method Electronic      Measurements from flowsheet : Measurements   12/14/2017 12:19 PM CST Height Measured - Standard 65.5 in    Weight Measured - Standard  141.6 lb    BSA 1.72 m2    Body Mass Index 23.2 kg/m2      General:  Alert and oriented, No acute distress.    Eye:  Pupils are equal, round and reactive to light, Extraocular movements are intact, Normal conjunctiva, Vision unchanged.    HENT:  Normocephalic, Tympanic membranes are clear, Oral mucosa is moist, No pharyngeal erythema.    Neck:  Supple, Non-tender, No carotid bruit, No jugular venous distention, No lymphadenopathy, No thyromegaly.    Respiratory:  Lungs are clear to auscultation, Respirations are non-labored, Breath sounds are equal, Symmetrical chest wall expansion.    Cardiovascular:  Normal rate, Regular rhythm, No murmur, Good pulses equal in all extremities, Normal peripheral perfusion, No edema.    Gastrointestinal:  Soft, Non-tender, Non-distended, Normal bowel sounds, No organomegaly.    Musculoskeletal:  Normal range of motion, Normal strength, No tenderness, No deformity, Normal gait, Some crepitus with left patellar compression  .    Integumentary:  Warm, Dry, No rash.    Neurologic:  Alert, Oriented, No focal deficits, Cranial Nerves II-XII are grossly intact.    Psychiatric:  Cooperative, Appropriate mood & affect.       Impression and Plan   Diagnosis     Well adult exam (CYE68-NE Z00.00).     Course:  Progressing as expected.    Plan:  fu 1 yr, We discussed every seeing sports medicine for her knee problem she will we talked for the holidays and make some decisions  .    Patient Instructions:       Counseled: Patient, Regarding diagnosis.

## 2024-06-01 ENCOUNTER — HEALTH MAINTENANCE LETTER (OUTPATIENT)
Age: 54
End: 2024-06-01